# Patient Record
Sex: MALE | Race: BLACK OR AFRICAN AMERICAN | NOT HISPANIC OR LATINO | Employment: UNEMPLOYED | URBAN - METROPOLITAN AREA
[De-identification: names, ages, dates, MRNs, and addresses within clinical notes are randomized per-mention and may not be internally consistent; named-entity substitution may affect disease eponyms.]

---

## 2023-10-03 ENCOUNTER — PHARMACY VISIT (OUTPATIENT)
Dept: PHARMACY | Facility: CLINIC | Age: 33
End: 2023-10-03
Payer: MEDICAID

## 2023-10-03 ENCOUNTER — OFFICE VISIT (OUTPATIENT)
Dept: PRIMARY CARE | Facility: CLINIC | Age: 33
End: 2023-10-03
Payer: COMMERCIAL

## 2023-10-03 VITALS
HEART RATE: 78 BPM | TEMPERATURE: 98.6 F | SYSTOLIC BLOOD PRESSURE: 130 MMHG | HEIGHT: 69 IN | WEIGHT: 132 LBS | BODY MASS INDEX: 19.55 KG/M2 | DIASTOLIC BLOOD PRESSURE: 87 MMHG | OXYGEN SATURATION: 100 %

## 2023-10-03 DIAGNOSIS — F11.90 OPIOID USE DISORDER: Primary | ICD-10-CM

## 2023-10-03 LAB
AMPHETAMINES UR QL SCN: ABNORMAL
BARBITURATES UR QL SCN: ABNORMAL
BZE UR QL SCN: ABNORMAL
CANNABINOIDS UR QL SCN: ABNORMAL
CREAT UR-MCNC: 146.4 MG/DL (ref 20–370)
PCP UR QL SCN: ABNORMAL

## 2023-10-03 PROCEDURE — 80307 DRUG TEST PRSMV CHEM ANLYZR: CPT

## 2023-10-03 PROCEDURE — 99204 OFFICE O/P NEW MOD 45 MIN: CPT | Performed by: STUDENT IN AN ORGANIZED HEALTH CARE EDUCATION/TRAINING PROGRAM

## 2023-10-03 PROCEDURE — 3008F BODY MASS INDEX DOCD: CPT | Performed by: STUDENT IN AN ORGANIZED HEALTH CARE EDUCATION/TRAINING PROGRAM

## 2023-10-03 PROCEDURE — 82570 ASSAY OF URINE CREATININE: CPT

## 2023-10-03 RX ORDER — IBUPROFEN 800 MG/1
800 TABLET ORAL EVERY 8 HOURS PRN
COMMUNITY
Start: 2023-09-23 | End: 2023-10-05 | Stop reason: ALTCHOICE

## 2023-10-03 RX ORDER — BUPRENORPHINE AND NALOXONE 12; 3 MG/1; MG/1
FILM, SOLUBLE BUCCAL; SUBLINGUAL
COMMUNITY
Start: 2023-05-26 | End: 2023-10-03 | Stop reason: ALTCHOICE

## 2023-10-03 RX ORDER — IBUPROFEN 600 MG/1
TABLET ORAL
COMMUNITY
Start: 2023-07-24 | End: 2023-10-05 | Stop reason: ALTCHOICE

## 2023-10-03 RX ORDER — POLYETHYLENE GLYCOL 3350 17 G/17G
17 POWDER, FOR SOLUTION ORAL DAILY PRN
Qty: 30 PACKET | Refills: 1 | Status: SHIPPED | OUTPATIENT
Start: 2023-10-03 | End: 2023-12-02

## 2023-10-03 RX ORDER — NALOXONE HYDROCHLORIDE 4 MG/.1ML
SPRAY NASAL
COMMUNITY
Start: 2023-05-26

## 2023-10-03 RX ORDER — ONDANSETRON 4 MG/1
TABLET, FILM COATED ORAL EVERY 6 HOURS PRN
COMMUNITY
Start: 2019-12-08 | End: 2023-10-05 | Stop reason: ALTCHOICE

## 2023-10-03 RX ORDER — AMOXICILLIN 500 MG/1
CAPSULE ORAL
COMMUNITY
Start: 2023-09-23 | End: 2023-10-05 | Stop reason: ALTCHOICE

## 2023-10-03 RX ORDER — DICYCLOMINE HYDROCHLORIDE 20 MG/1
TABLET ORAL 3 TIMES DAILY PRN
COMMUNITY
Start: 2019-12-08 | End: 2023-10-05 | Stop reason: ALTCHOICE

## 2023-10-03 RX ORDER — LEVETIRACETAM 500 MG/1
TABLET ORAL
COMMUNITY
Start: 2023-05-26 | End: 2023-10-05 | Stop reason: ALTCHOICE

## 2023-10-03 RX ORDER — BUPRENORPHINE AND NALOXONE 12; 3 MG/1; MG/1
1 FILM, SOLUBLE BUCCAL; SUBLINGUAL DAILY
Qty: 14 TABLET | Refills: 1 | Status: SHIPPED | OUTPATIENT
Start: 2023-10-03 | End: 2023-10-10 | Stop reason: SDUPTHER

## 2023-10-03 ASSESSMENT — PATIENT HEALTH QUESTIONNAIRE - PHQ9
SUM OF ALL RESPONSES TO PHQ9 QUESTIONS 1 AND 2: 2
2. FEELING DOWN, DEPRESSED OR HOPELESS: SEVERAL DAYS
1. LITTLE INTEREST OR PLEASURE IN DOING THINGS: SEVERAL DAYS
10. IF YOU CHECKED OFF ANY PROBLEMS, HOW DIFFICULT HAVE THESE PROBLEMS MADE IT FOR YOU TO DO YOUR WORK, TAKE CARE OF THINGS AT HOME, OR GET ALONG WITH OTHER PEOPLE: NOT DIFFICULT AT ALL

## 2023-10-03 ASSESSMENT — COLUMBIA-SUICIDE SEVERITY RATING SCALE - C-SSRS
6. HAVE YOU EVER DONE ANYTHING, STARTED TO DO ANYTHING, OR PREPARED TO DO ANYTHING TO END YOUR LIFE?: NO
2. HAVE YOU ACTUALLY HAD ANY THOUGHTS OF KILLING YOURSELF?: NO
1. IN THE PAST MONTH, HAVE YOU WISHED YOU WERE DEAD OR WISHED YOU COULD GO TO SLEEP AND NOT WAKE UP?: NO

## 2023-10-03 ASSESSMENT — ENCOUNTER SYMPTOMS
OCCASIONAL FEELINGS OF UNSTEADINESS: 0
LOSS OF SENSATION IN FEET: 0
DEPRESSION: 1

## 2023-10-03 ASSESSMENT — PAIN SCALES - GENERAL: PAINLEVEL: 0-NO PAIN

## 2023-10-03 NOTE — PROGRESS NOTES
Subjective   Davin Martínez is a 32 y.o. male who presents for Buprenorphine initiation.     Was first prescribed buprenorphine-naloxone at the end of May after an admission for an overdose. 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 Seizures have not reoccurred since that admission. He was lost to follow up after that admission but reports that he has been stretching his prescription to last as long as possible. He was prescribed buprenorphine-naloxone 12-3 mg BID at discharge and says that he has been taking as little as 1/2 tab daily to make it last. Today is his last dose but he would like to continue using it; although with the ultimate goal of cessation. Since that admission he has gone to a few 12-step meetings but says he felt they weren't for him. Hasn't used since being in the hospital; which is the longest he's gone without doing anything in a long time. He does report withdrawals when it wears off but says that a full tab lasts him long enough that withdrawals aren't an issue. Support system is wife and kids. Doesn't want to go back to using for his kids. They're 8, 5 (in ), and 6 months.    Denies any medical history.  No prior surgeries.  No known allergies.  Meds: no medications; takes ibuprofen PRN    Treatment history: current prescription is leftover from May admission  Recent use: none  Other substances currently used:  none  Nicotine: smoking; a couple cigarettes daily  Occupation: doing side jobs  Living Situation: living with wife and kids    Clinical Opiate Withdrawal Scale  Heart Rate: 78  Patient Position: Sitting    OARRS:  I have personally reviewed the OARRS report for Davin Martínez. I have considered the risks of abuse, dependence, addiction and diversion    Urine Drug Screening  Last Urine Drug Screen: 5/17/23    Recent  "Results (from the past 8760 hour(s))   DRUG SCREEN,URINE    Collection Time: 05/17/23 10:00 AM   Result Value Ref Range    DRUG SCREEN COMMENT URINE SEE BELOW     Amphetamine Screen, Urine PRESUMPTIVE NEGATIVE NEGATIVE    Barbiturate Screen, Urine PRESUMPTIVE NEGATIVE NEGATIVE    BENZODIAZEPINE (PRESENCE) IN URINE BY SCREEN METHOD PRESUMPTIVE POSITIVE (A) NEGATIVE    Cannabinoid Screen, Urine PRESUMPTIVE POSITIVE (A) NEGATIVE    Cocaine Screen, Urine PRESUMPTIVE NEGATIVE NEGATIVE    Fentanyl, Ur PRESUMPTIVE POSITIVE (A) NEGATIVE    Methadone Screen, Urine PRESUMPTIVE NEGATIVE NEGATIVE    Opiate Screen, Urine PRESUMPTIVE POSITIVE (A) NEGATIVE    Oxycodone Screen, Ur PRESUMPTIVE NEGATIVE NEGATIVE    PCP Screen, Urine PRESUMPTIVE NEGATIVE NEGATIVE     Results are as expected.  Patient was actively using at the time.    Withdrawal Symptoms: anxiety and chills after about 12-14 hours.  Buprenorphine Side Effects: constipation occasionally    Agreement for Buprenorphine/(Naloxone) for the treatment of Opioid use  Reviewed Controlled Substance Agreement including but not limited to the benefits, risks, and alternatives to treatment with a Controlled Substance medication(s).   Date of the Last Agreement: will file today    Nicotine Use  Smoking; wants to stop but hasn't quite gotten to the point for quitting yet    Health Maintenance  HIV from 5/17/23 was negative    No results found for: \"LABRPR\", \"RPR\"  Hepatitis C Ab   Date Value Ref Range Status   05/18/2023 NONREACTIVE NONREACTIVE Final     Comment:      Results from patients taking biotin supplements or receiving   high-dose biotin therapy should be interpreted with caution   due to possible interference with this test. Providers may    contact their local laboratory for further information.       Patient is meeting requirements of buprenorphine program, including : abstinence from alcohol and other drugs  and meeting functional/personal goals    Review of Systems " "  Constitutional:  Negative for chills and fever.   Gastrointestinal:  Positive for constipation.   Neurological:  Negative for seizures.     Objective   /87 (BP Location: Right arm, Patient Position: Sitting, BP Cuff Size: Adult)   Pulse 78   Temp 37 °C (98.6 °F) (Temporal)   Ht 1.753 m (5' 9\")   Wt 59.9 kg (132 lb)   SpO2 100%   BMI 19.49 kg/m²     Physical Exam  Vitals reviewed.   Constitutional:       General: He is not in acute distress.     Appearance: Normal appearance.   HENT:      Head: Normocephalic and atraumatic.   Eyes:      Conjunctiva/sclera: Conjunctivae normal.   Cardiovascular:      Rate and Rhythm: Normal rate and regular rhythm.      Heart sounds: No murmur heard.     No friction rub. No gallop.   Pulmonary:      Effort: No respiratory distress.      Breath sounds: Normal breath sounds.   Skin:     General: Skin is warm and dry.   Neurological:      General: No focal deficit present.      Mental Status: He is alert. Mental status is at baseline.   Psychiatric:         Behavior: Behavior normal.       Assessment/Plan   Problem List Items Addressed This Visit             ICD-10-CM    Opioid use disorder - Primary F11.90     Buprenorphine Program:  Meeting requirements to initiate program, for abstinence based OUD recovery goals.  Urine drug screens: ordered screen today  OARRS: Results are as expected.   Recovery Activities: referring to Dr. Vanegas    Risks for abuse of Buprenorphine/Naloxone and safety requirements  (hidden, locked area, out of reach of children and pets; Narcan prescription) are revisited yearly with CSA and as needed- no current concerns.    Benefits of treatment for relapse prevention outweigh risks associated with Buprenorphine.    Plan/Revision to Plan:  Continue current dose of Buprenorphine/Naloxone: 12-3 mg daily  OARRS at every appointment and as needed.  Drug screen random, quarterly and at other times as needed.  Meetings encouraged when safe and " accessible.  Counselling available as needed.         Relevant Medications    polyethylene glycol (Glycolax, Miralax) 17 gram packet    buprenorphine-naloxone (Suboxone) 12-3 mg per sublingual film    Other Relevant Orders    Opiate/Opioid/Benzo Extended Prescription Compliance    Buprenorphine Confirm,Urine     Follow-up in 2 weeks for continued establishment with Buprenorphine Program     Patient seen and discussed with Dr. Cruz.    Suhas Head MD   PGY-3  [facility]

## 2023-10-03 NOTE — PATIENT INSTRUCTIONS
Dear Davin Martínez,     It was a pleasure getting to manage your care with you today.     Prescriptions:  We have sent medication prescriptions to your pharmacy on file. Please pick them up at your earliest convenience.     Referral:   We have provided you the below referrals. Please call to schedule referrals if no one has contacted you within 3 days.   1. Dr. Vanegas    Follow up Appointment: 1.5 weeks for dose reassessment    Emergency  In the case of an emergency please call 911 or visit the Emergency Department immediately for evaluation.     We look forward to continuing your care here at our Clinic. Take Care.     Sincerely,   Suhas Head MD

## 2023-10-05 PROBLEM — R56.9 SEIZURE-LIKE ACTIVITY (MULTI): Status: ACTIVE | Noted: 2022-10-18

## 2023-10-05 PROBLEM — F11.921: Status: RESOLVED | Noted: 2022-10-18 | Resolved: 2023-10-05

## 2023-10-05 PROBLEM — F11.921: Status: ACTIVE | Noted: 2022-10-18

## 2023-10-05 PROBLEM — S06.89AA: Status: ACTIVE | Noted: 2023-10-05

## 2023-10-05 PROBLEM — R56.9 SEIZURE-LIKE ACTIVITY (MULTI): Status: RESOLVED | Noted: 2022-10-18 | Resolved: 2023-10-05

## 2023-10-05 ASSESSMENT — ENCOUNTER SYMPTOMS
FEVER: 0
CONSTIPATION: 1
SEIZURES: 0
CHILLS: 0

## 2023-10-06 NOTE — ASSESSMENT & PLAN NOTE
Buprenorphine Program:  Meeting requirements to initiate program, for abstinence based OUD recovery goals.  Urine drug screens: ordered screen today  OARRS: Results are as expected.   Recovery Activities: referring to Dr. Vanegas    Risks for abuse of Buprenorphine/Naloxone and safety requirements  (hidden, locked area, out of reach of children and pets; Narcan prescription) are revisited yearly with CSA and as needed- no current concerns.    Benefits of treatment for relapse prevention outweigh risks associated with Buprenorphine.    Plan/Revision to Plan:  Continue current dose of Buprenorphine/Naloxone: 12-3 mg daily  OARRS at every appointment and as needed.  Drug screen random, quarterly and at other times as needed.  Meetings encouraged when safe and accessible.  Counselling available as needed.

## 2023-10-07 LAB
BUPRENORPHINE UR-MCNC: 51 NG/ML
BUPRENORPHINE UR-MCNC: <2 NG/ML
NALOXONE UR CFM-MCNC: <100 NG/ML
NORBUPRENORPHINE UR CFM-MCNC: 127 NG/ML
NORBUPRENORPHINE UR-MCNC: 27 NG/ML

## 2023-10-10 DIAGNOSIS — F11.90 OPIOID USE DISORDER: ICD-10-CM

## 2023-10-10 RX ORDER — BUPRENORPHINE AND NALOXONE 12; 3 MG/1; MG/1
1 FILM, SOLUBLE BUCCAL; SUBLINGUAL DAILY
Qty: 16 TABLET | Refills: 0 | Status: SHIPPED | OUTPATIENT
Start: 2023-10-10 | End: 2023-11-25 | Stop reason: SDUPTHER

## 2023-10-10 NOTE — PROGRESS NOTES
Unable to get follow up until 10/31. Will provide additional 2 week supply to last until that visit.

## 2023-10-13 NOTE — PROGRESS NOTES
I saw and evaluated the patient. I personally obtained the key and critical portions of the history and physical exam or was physically present for key and critical portions performed by the resident/fellow. I reviewed the resident/fellow's documentation and discussed the patient with the resident/fellow. I agree with the resident/fellow's medical decision making as documented in the note.    Lise Cruz MD

## 2023-10-26 ENCOUNTER — PHARMACY VISIT (OUTPATIENT)
Dept: PHARMACY | Facility: CLINIC | Age: 33
End: 2023-10-26
Payer: MEDICAID

## 2023-10-26 ENCOUNTER — TELEPHONE (OUTPATIENT)
Dept: PRIMARY CARE | Facility: CLINIC | Age: 33
End: 2023-10-26

## 2023-10-26 PROCEDURE — RXMED WILLOW AMBULATORY MEDICATION CHARGE

## 2023-10-26 NOTE — TELEPHONE ENCOUNTER
PT is coming in asking for a refill of their Suboxone.  I am going to try to get him scheduled with you at your earliers appointment.

## 2023-11-09 NOTE — TELEPHONE ENCOUNTER
Copied from CRM #[id number]. Topic: Transfer to Department for Scheduling  >> Oct 26, 2023  9:57 AM Mary KERR wrote:  Patients needs medication refill

## 2023-11-25 ENCOUNTER — PHARMACY VISIT (OUTPATIENT)
Dept: PHARMACY | Facility: CLINIC | Age: 33
End: 2023-11-25

## 2023-11-25 ENCOUNTER — HOSPITAL ENCOUNTER (EMERGENCY)
Facility: HOSPITAL | Age: 33
Discharge: HOME | End: 2023-11-25
Attending: STUDENT IN AN ORGANIZED HEALTH CARE EDUCATION/TRAINING PROGRAM

## 2023-11-25 VITALS
SYSTOLIC BLOOD PRESSURE: 120 MMHG | RESPIRATION RATE: 18 BRPM | OXYGEN SATURATION: 100 % | DIASTOLIC BLOOD PRESSURE: 61 MMHG | HEART RATE: 87 BPM | TEMPERATURE: 97.8 F | BODY MASS INDEX: 19.55 KG/M2 | WEIGHT: 132 LBS | HEIGHT: 69 IN

## 2023-11-25 DIAGNOSIS — F11.90 OPIOID USE DISORDER: ICD-10-CM

## 2023-11-25 DIAGNOSIS — Z76.0 MEDICATION REFILL: Primary | ICD-10-CM

## 2023-11-25 PROCEDURE — 99283 EMERGENCY DEPT VISIT LOW MDM: CPT | Performed by: STUDENT IN AN ORGANIZED HEALTH CARE EDUCATION/TRAINING PROGRAM

## 2023-11-25 PROCEDURE — 2500000002 HC RX 250 W HCPCS SELF ADMINISTERED DRUGS (ALT 637 FOR MEDICARE OP, ALT 636 FOR OP/ED): Mod: SE | Performed by: STUDENT IN AN ORGANIZED HEALTH CARE EDUCATION/TRAINING PROGRAM

## 2023-11-25 RX ORDER — BUPRENORPHINE AND NALOXONE 4; 1 MG/1; MG/1
3 FILM, SOLUBLE BUCCAL; SUBLINGUAL ONCE
Status: COMPLETED | OUTPATIENT
Start: 2023-11-25 | End: 2023-11-25

## 2023-11-25 RX ORDER — BUPRENORPHINE AND NALOXONE 12; 3 MG/1; MG/1
1 FILM, SOLUBLE BUCCAL; SUBLINGUAL DAILY
Qty: 16 EACH | Refills: 0 | Status: SHIPPED | OUTPATIENT
Start: 2023-11-25 | End: 2024-01-15 | Stop reason: SDUPTHER

## 2023-11-25 RX ADMIN — BUPRENORPHINE AND NALOXONE 3 FILM: 4; 1 FILM BUCCAL; SUBLINGUAL at 11:28

## 2023-11-25 ASSESSMENT — LIFESTYLE VARIABLES
HAVE YOU EVER FELT YOU SHOULD CUT DOWN ON YOUR DRINKING: NO
EVER FELT BAD OR GUILTY ABOUT YOUR DRINKING: NO
EVER HAD A DRINK FIRST THING IN THE MORNING TO STEADY YOUR NERVES TO GET RID OF A HANGOVER: NO
REASON UNABLE TO ASSESS: NO
HAVE PEOPLE ANNOYED YOU BY CRITICIZING YOUR DRINKING: NO

## 2023-11-25 ASSESSMENT — PAIN SCALES - GENERAL: PAINLEVEL_OUTOF10: 0 - NO PAIN

## 2023-11-25 ASSESSMENT — PAIN - FUNCTIONAL ASSESSMENT: PAIN_FUNCTIONAL_ASSESSMENT: 0-10

## 2023-11-25 NOTE — ED TRIAGE NOTES
Pt states states he's withdrawn from percocet that he took 2days ago. Pt states he took 6 pills in one day because he was in pain from his back. States he did not get the script from here

## 2023-11-25 NOTE — ED PROVIDER NOTES
HPI  Patient is a 32-year-old male with PMH of opiate use disorder presented to the emergency department due to medication refill.  Patient states that he takes Suboxone 12-3 mg daily, however unfortunately ran out over the past 2 days.  Patient states that he last took Suboxone approximately 48 hours ago and feels like he has been withdrawing for the past 24 hours.  Does feel agitated and started to have abdominal cramps, however denies any zak vomiting or diarrhea at this time.  Does follow-up with family medicine as an outpatient.    Physical Exam  VITALS: Vital signs reviewed in nursing triage note, EMR flow sheets, and at patient's bedside  CONSTITUTIONAL: Well-appearing, in no apparent distress  HEAD: NCAT  EYES: PERRL, EOMI  NECK: Full ROM  CARD:  No visible JVD or lower extremity edema  RESP: Speaking in full sentences, no increased work of breathing  ABD: Nondistended, nontender  EXT: Full ROM in all extremities  SKIN: No rashes or lesions  NEURO: MAEx4, AAOX4  PSYCH: Appropriate mood and affect, no HI/SI, not responding to internal stimuli    Vitals:    11/25/23 1041   BP: 120/61   Pulse: 87   Resp: 18   Temp: 36.6 °C (97.8 °F)   SpO2: 100%        Assessment/Plan/MDM  Patient clinically stable with normal vital signs upon presentation to the emergency department.  Given his symptoms of withdrawal, was provided with a dose of his home Suboxone here in the emergency department.  Prescription sent to [facility] pharmacy per his preference.  Patient discharged in stable condition.    Results  *See section(s) entitled ``Lab Results´´, ``Diagnostic Imaging Results Review´´ for entirety.  Notable results listed below  - EKG, labs, and imaging deemed not necessary at this visit    Clinical Impression  Medication refill    Dispo:   Discharge home    Home: I discussed the differential, results and discharge plan with the patient and/or family/friend/caregiver if present. I emphasized the importance of follow-up with  the physician I referred them to in the timeframe recommended. I explained reasons for the patient to return to the Emergency Department. Questions were addressed. They understand return precautions and discharge instructions. The patient and/or family/friend/caregiver expressed understanding and agreement with assessment/plan.     Patient seen and discussed with attending physician Dr. Perez.    Rodger Frey MD  Emergency Medicine, PGY-3    Was dictated using Dragon dictation. Please excuse any errors found in the note.     Rodger Frey MD  Resident  11/25/23 7562

## 2023-11-27 ENCOUNTER — HOSPITAL ENCOUNTER (EMERGENCY)
Facility: HOSPITAL | Age: 33
Discharge: HOME | End: 2023-11-27
Attending: EMERGENCY MEDICINE

## 2023-11-27 ENCOUNTER — PHARMACY VISIT (OUTPATIENT)
Dept: PHARMACY | Facility: CLINIC | Age: 33
End: 2023-11-27

## 2023-11-27 VITALS
DIASTOLIC BLOOD PRESSURE: 66 MMHG | SYSTOLIC BLOOD PRESSURE: 133 MMHG | HEIGHT: 69 IN | BODY MASS INDEX: 22.22 KG/M2 | HEART RATE: 77 BPM | WEIGHT: 150 LBS | OXYGEN SATURATION: 100 % | TEMPERATURE: 98 F | RESPIRATION RATE: 16 BRPM

## 2023-11-27 DIAGNOSIS — F11.93 OPIOID WITHDRAWAL (MULTI): Primary | ICD-10-CM

## 2023-11-27 PROCEDURE — RXMED WILLOW AMBULATORY MEDICATION CHARGE

## 2023-11-27 PROCEDURE — 2500000005 HC RX 250 GENERAL PHARMACY W/O HCPCS: Mod: SE | Performed by: EMERGENCY MEDICINE

## 2023-11-27 PROCEDURE — 99284 EMERGENCY DEPT VISIT MOD MDM: CPT | Performed by: EMERGENCY MEDICINE

## 2023-11-27 PROCEDURE — 99283 EMERGENCY DEPT VISIT LOW MDM: CPT | Performed by: EMERGENCY MEDICINE

## 2023-11-27 RX ORDER — LOPERAMIDE HYDROCHLORIDE 2 MG/1
2 CAPSULE ORAL 4 TIMES DAILY PRN
Qty: 12 CAPSULE | Refills: 0 | Status: SHIPPED | OUTPATIENT
Start: 2023-11-27 | End: 2023-11-30

## 2023-11-27 RX ORDER — CLONIDINE HYDROCHLORIDE 0.1 MG/1
0.1 TABLET ORAL ONCE
Status: DISCONTINUED | OUTPATIENT
Start: 2023-11-27 | End: 2023-11-27 | Stop reason: HOSPADM

## 2023-11-27 RX ORDER — ONDANSETRON 4 MG/1
4 TABLET, ORALLY DISINTEGRATING ORAL ONCE
Status: COMPLETED | OUTPATIENT
Start: 2023-11-27 | End: 2023-11-27

## 2023-11-27 RX ORDER — ONDANSETRON 4 MG/1
TABLET, FILM COATED ORAL
Status: DISCONTINUED
Start: 2023-11-27 | End: 2023-11-27 | Stop reason: HOSPADM

## 2023-11-27 RX ORDER — ONDANSETRON 4 MG/1
4 TABLET, FILM COATED ORAL EVERY 6 HOURS
Qty: 12 TABLET | Refills: 0 | Status: SHIPPED | OUTPATIENT
Start: 2023-11-27 | End: 2023-11-30

## 2023-11-27 RX ORDER — CLONIDINE HYDROCHLORIDE 0.2 MG/1
0.1 TABLET ORAL 2 TIMES DAILY
Qty: 20 TABLET | Refills: 0 | Status: SHIPPED | OUTPATIENT
Start: 2023-11-27 | End: 2023-12-18

## 2023-11-27 RX ADMIN — ONDANSETRON 4 MG: 4 TABLET, ORALLY DISINTEGRATING ORAL at 10:00

## 2023-11-27 NOTE — ED PROVIDER NOTES
HPI   Chief Complaint   Patient presents with    Medication Reaction       HPI  Young man with a history of opioid use disorder, last used in May currently on Suboxone presenting to the emergency department with nausea, myalgias, yawning, abdominal crampy discomfort, loose stools in the setting of being without his Suboxone.  Patient was seen and evaluated for complaint of same a couple of days ago, at that time was written a prescription, the prescription is available at the pharmacy needed insurance approval but he is still having symptoms persistent.  He denies any dysuria or hematuria no falls injuries or trauma no headache no vision disturbance no other complaints, states this feels like his typical withdrawal.                  [place] Coma Scale Score: 15                  Patient History   Past Medical History:   Diagnosis Date    Crushing injury of finger 04/12/2011    Open fracture of phalanx or phalanges of hand 04/12/2011    Opioid intoxication delirium (CMS/HCC) 10/18/2022    Seizure-like activity (CMS/[facility]) 10/18/2022     Past Surgical History:   Procedure Laterality Date    CT ANGIO NECK  5/24/2023    CT NECK ANGIO W AND WO IV CONTRAST 5/24/2023 [facility]    CT HEAD ANGIO W AND WO IV CONTRAST  5/24/2023    CT HEAD ANGIO W AND WO IV CONTRAST 5/24/2023 [facility] CT     No family history on file.  Social History     Tobacco Use    Smoking status: Every Day     Types: Cigarettes    Smokeless tobacco: Never   Substance Use Topics    Alcohol use: Never    Drug use: Not Currently     Types: Marijuana       Physical Exam   ED Triage Vitals [11/27/23 0919]   Temp Heart Rate Resp BP   36.7 °C (98 °F) 77 16 133/66      SpO2 Temp Source Heart Rate Source Patient Position   100 % Temporal -- --      BP Location FiO2 (%)     -- --       Physical Exam  Constitutional:       Appearance: Normal appearance. He is normal weight.   HENT:      Head: Normocephalic and atraumatic.      Right Ear: External ear normal.      Left Ear:  External ear normal.      Nose: Congestion present.      Mouth/Throat:      Mouth: Mucous membranes are moist.      Pharynx: Oropharynx is clear.   Eyes:      Pupils: Pupils are equal, round, and reactive to light.   Cardiovascular:      Rate and Rhythm: Normal rate and regular rhythm.   Pulmonary:      Effort: Pulmonary effort is normal.      Breath sounds: Normal breath sounds.   Abdominal:      General: Abdomen is flat.      Palpations: Abdomen is soft.   Musculoskeletal:         General: Normal range of motion.      Cervical back: Normal range of motion and neck supple.   Skin:     General: Skin is warm and dry.      Capillary Refill: Capillary refill takes less than 2 seconds.   Neurological:      General: No focal deficit present.      Mental Status: He is alert and oriented to person, place, and time.   Psychiatric:         Mood and Affect: Mood normal.         Behavior: Behavior normal.         Thought Content: Thought content normal.         Judgment: Judgment normal.         ED Course & MDM        Medical Decision Making  -Chart was reviewed, the patient was provided with a prescription, he states that he has access to the prescription, is here for symptomatic management.  -The patient drove his car here with his young son, unable to secure a ride, because of this I had a discussion with the patient at the bedside of the risk of prescribing an acute dose of his Suboxone, instead we will give him medications for symptom management including clonidine, Zofran  -Given that the patient is to  his prescription for Suboxone I will provide him with paper prescriptions for clonidine and Zofran as well as loperamide to be filled [facility] so that he has immediate access to symptom management in the interim.  -Patient is to resume his Suboxone as scheduled and prescribed, follow-up outpatient with his primary physician as scheduled monitor for symptoms and return with concerns.       Santi HO  DO Pranay  11/27/23 1002

## 2023-11-27 NOTE — ED TRIAGE NOTES
Pt coming with withdrawal symptoms states he been taking this medication X 2 months that he was prescribed, unable to tell what what the medication is accept it is to treat his bipolar he is feeling sweaty and anxious last time he took the medication was 4 days ago, pt also complains of chills and body aches. Pt denies SI/HI

## 2023-11-29 ENCOUNTER — HOSPITAL ENCOUNTER (EMERGENCY)
Facility: HOSPITAL | Age: 33
Discharge: HOME | End: 2023-11-29
Attending: EMERGENCY MEDICINE

## 2023-11-29 ENCOUNTER — PHARMACY VISIT (OUTPATIENT)
Dept: PHARMACY | Facility: CLINIC | Age: 33
End: 2023-11-29

## 2023-11-29 VITALS
TEMPERATURE: 98.4 F | HEIGHT: 69 IN | RESPIRATION RATE: 16 BRPM | OXYGEN SATURATION: 99 % | BODY MASS INDEX: 22.22 KG/M2 | SYSTOLIC BLOOD PRESSURE: 120 MMHG | DIASTOLIC BLOOD PRESSURE: 61 MMHG | WEIGHT: 150 LBS | HEART RATE: 84 BPM

## 2023-11-29 DIAGNOSIS — Z76.0 MEDICATION REFILL: Primary | ICD-10-CM

## 2023-11-29 PROCEDURE — 99283 EMERGENCY DEPT VISIT LOW MDM: CPT | Performed by: NURSE PRACTITIONER

## 2023-11-29 PROCEDURE — RXMED WILLOW AMBULATORY MEDICATION CHARGE

## 2023-11-29 PROCEDURE — 99281 EMR DPT VST MAYX REQ PHY/QHP: CPT | Mod: 27 | Performed by: EMERGENCY MEDICINE

## 2023-11-29 ASSESSMENT — PAIN - FUNCTIONAL ASSESSMENT: PAIN_FUNCTIONAL_ASSESSMENT: 0-10

## 2023-11-29 ASSESSMENT — LIFESTYLE VARIABLES
EVER FELT BAD OR GUILTY ABOUT YOUR DRINKING: NO
REASON UNABLE TO ASSESS: NO
REASON UNABLE TO ASSESS: NO
HAVE PEOPLE ANNOYED YOU BY CRITICIZING YOUR DRINKING: NO
HAVE YOU EVER FELT YOU SHOULD CUT DOWN ON YOUR DRINKING: NO
HAVE PEOPLE ANNOYED YOU BY CRITICIZING YOUR DRINKING: NO
EVER FELT BAD OR GUILTY ABOUT YOUR DRINKING: NO
EVER HAD A DRINK FIRST THING IN THE MORNING TO STEADY YOUR NERVES TO GET RID OF A HANGOVER: NO
EVER HAD A DRINK FIRST THING IN THE MORNING TO STEADY YOUR NERVES TO GET RID OF A HANGOVER: NO

## 2023-11-29 ASSESSMENT — PAIN DESCRIPTION - LOCATION: LOCATION: GENERALIZED

## 2023-11-29 ASSESSMENT — PAIN SCALES - GENERAL
PAINLEVEL_OUTOF10: 0 - NO PAIN
PAINLEVEL_OUTOF10: 6

## 2023-11-29 NOTE — ED PROVIDER NOTES
Emergency Department Encounter  [facility] EMERGENCY MEDICINE    Patient: Davin Martínez  MRN: [id number]  : 1990  Date of Evaluation: 2023  ED Provider: NATALIIA Magallon      Chief Complaint       Chief Complaint   Patient presents with    Generalized Body Aches     [place]       Limitations to History: None Language Barrier Confusion Intoxication Uncooperative Dementia  Historian: Patient, Family, EMS, Significant other,Caregiver, Friend, CPD  Records reviewed: EMR inpatient and outpatient notes, Care Everywhere    Davin Martínez is a 32 y.o. male who presents to the emergency department complaining of withdrawal symptoms from Suboxone.  Patient states he last filled his Suboxone in October.  He did not remember to renew his insurance and when he went to fill his Suboxone this month they would not pay for the medication.  This is his third visit to the emergency department for withdrawal symptoms.  On his first visit he was given a dose of Suboxone.  On his second visit 2 days ago he was given prescriptions for clonidine and Zofran.  He did fill the clonidine and took the medication but states that now made him feel worse.  He did not fill the Zofran prescription.  He is here today as his insurance still has not kicked in yet.  He states that it will start again on  which is in 2 days.  He is hoping to get a another dose of the Suboxone here in the emergency department today.  He states he is has some symptoms of being sweaty and having chills.  He has had no exposure to anyone who is sick.  He denies any abdominal pain fever chest pain or shortness of breath.  He does get his prescription filled by his family physician.  The medication is at the pharmacy and is waiting to be picked up.    ROS:     Review of Systems  All other systems have been reviewed and are otherwise acutely negative except as in the [place].    Past History     Past Medical History:   Diagnosis  Date    Crushing injury of finger 04/12/2011    Open fracture of phalanx or phalanges of hand 04/12/2011    Opioid intoxication delirium (CMS/[facility]) 10/18/2022    Seizure-like activity (CMS/[facility]) 10/18/2022     Past Surgical History:   Procedure Laterality Date    CT ANGIO NECK  5/24/2023    CT NECK ANGIO W AND WO IV CONTRAST 5/24/2023 [facility]    CT HEAD ANGIO W AND WO IV CONTRAST  5/24/2023    CT HEAD ANGIO W AND WO IV CONTRAST 5/24/2023 [facility]           Medications/Allergies     Previous Medications    BUPRENORPHINE-NALOXONE (SUBOXONE) 12-3 MG PER SUBLINGUAL FILM    Place 1 Film under the tongue once daily.    CLONIDINE (CATAPRES) 0.2 MG TABLET    Take 1/2 tablets (0.1 mg) by mouth 2 times a day for 15 days.    LOPERAMIDE (IMODIUM) 2 MG CAPSULE    Take 1 capsule (2 mg) by mouth 4 times a day as needed for diarrhea for up to 3 days.    NALOXONE (NARCAN) 4 MG/0.1 ML NASAL SPRAY        ONDANSETRON (ZOFRAN) 4 MG TABLET    Take 1 tablet (4 mg) by mouth every 6 hours for 3 days.    POLYETHYLENE GLYCOL (GLYCOLAX, MIRALAX) 17 GRAM PACKET    Take 17 g by mouth once daily as needed (constipation).     No Known Allergies     Physical Exam       ED Triage Vitals   Temp Heart Rate Resp BP   11/29/23 0940 11/29/23 0940 11/29/23 0940 11/29/23 0940   (!) 16 °C (60.8 °F) 84 16 120/61      SpO2 Temp Source Heart Rate Source Patient Position   11/29/23 0940 11/29/23 1029 -- --   99 % Oral        BP Location FiO2 (%)     -- --               Physical Exam    GENERAL:  The patient appears nourished and normally developed. Vital signs as documented.     HEENT:  Head normocephalic, atraumatic, EOMs intact, PERRLA, Mucous membranes moist. Nares patent without copious rhinorrhea.  No lymphadenopathy.    PULMONARY:  Lungs are clear to auscultation, without any respiratory distress. Able to speak full sentences, no accessory muscle use    CARDIAC:   Normal rate. No murmurs, rubs or gallops    MUSCULOSKELETAL:   No tenderness of cervical,  "thoracic and lumbar spine, no step off or deformity noted,  Able to ambulate, Non edematous, with no obvious deformities    SKIN:   Good color, with no significant rashes on exposed skin      NEURO:  No obvious neurological deficits, normal sensation and strength bilaterally.  Able to follow commands, CN 2-12 grossly intact.      Assessment   In brief, Davin Martínez is a 32 y.o. male who presented to the emergency department for withdrawal symptoms from Suboxone.  Patient will go to the pharmacy he states that he can pay cash for 2 days of the medication.  When his insurance kicks in on December 1 then he will be able to  the entire prescription on his insurance.  I have discussed this plan of care with my attending physician as he has been to the emergency department 3 times this week.  My attending physician is in agreement with the plan of care.  Patient is also in agreement with the plan of care.      ED Course     Diagnoses as of 11/29/23 1047   Medication refill       Visit Vitals  /61   Pulse 84   Temp 36.9 °C (98.4 °F) (Oral)   Resp 16   Ht 1.753 m (5' 9\")   Wt 68 kg (150 lb)   SpO2 99%   BMI 22.15 kg/m²   Smoking Status Every Day   BSA 1.82 m²       Medications - No data to display    Plan of care discussed,       Final Impression      1. Medication refill          DISPOSITION  Disposition: Discharge  Patient condition is: Stable    Comment: Please note this report has been produced using speech recognition software and may contain errors related to that system including errors in grammar, punctuation, and spelling, as well as words and phrases that may be inappropriate.  If there are any questions or concerns please feel free to contact the dictating provider for clarification.    DELON Magallon-NATALIIA Block  11/29/23 1054    "

## 2023-12-05 ENCOUNTER — PHARMACY VISIT (OUTPATIENT)
Dept: PHARMACY | Facility: CLINIC | Age: 33
End: 2023-12-05

## 2023-12-05 PROCEDURE — RXMED WILLOW AMBULATORY MEDICATION CHARGE

## 2023-12-11 ENCOUNTER — PHARMACY VISIT (OUTPATIENT)
Dept: PHARMACY | Facility: CLINIC | Age: 33
End: 2023-12-11

## 2023-12-11 PROCEDURE — RXMED WILLOW AMBULATORY MEDICATION CHARGE

## 2023-12-16 ENCOUNTER — PHARMACY VISIT (OUTPATIENT)
Dept: PHARMACY | Facility: CLINIC | Age: 33
End: 2023-12-16

## 2023-12-16 PROCEDURE — RXMED WILLOW AMBULATORY MEDICATION CHARGE

## 2023-12-22 ENCOUNTER — PHARMACY VISIT (OUTPATIENT)
Dept: PHARMACY | Facility: CLINIC | Age: 33
End: 2023-12-22

## 2023-12-22 PROCEDURE — RXMED WILLOW AMBULATORY MEDICATION CHARGE

## 2023-12-28 ENCOUNTER — PHARMACY VISIT (OUTPATIENT)
Dept: PHARMACY | Facility: CLINIC | Age: 33
End: 2023-12-28

## 2023-12-28 PROCEDURE — RXMED WILLOW AMBULATORY MEDICATION CHARGE

## 2024-01-03 ENCOUNTER — PHARMACY VISIT (OUTPATIENT)
Dept: PHARMACY | Facility: CLINIC | Age: 34
End: 2024-01-03
Payer: MEDICARE

## 2024-01-03 PROCEDURE — RXMED WILLOW AMBULATORY MEDICATION CHARGE

## 2024-01-08 ENCOUNTER — PHARMACY VISIT (OUTPATIENT)
Dept: PHARMACY | Facility: CLINIC | Age: 34
End: 2024-01-08
Payer: MEDICARE

## 2024-01-08 PROCEDURE — RXMED WILLOW AMBULATORY MEDICATION CHARGE

## 2024-01-15 ENCOUNTER — HOSPITAL ENCOUNTER (EMERGENCY)
Facility: HOSPITAL | Age: 34
Discharge: HOME | End: 2024-01-15
Payer: COMMERCIAL

## 2024-01-15 VITALS
TEMPERATURE: 97.5 F | RESPIRATION RATE: 16 BRPM | OXYGEN SATURATION: 100 % | DIASTOLIC BLOOD PRESSURE: 77 MMHG | HEART RATE: 76 BPM | SYSTOLIC BLOOD PRESSURE: 138 MMHG

## 2024-01-15 DIAGNOSIS — Z76.0 MEDICATION REFILL: Primary | ICD-10-CM

## 2024-01-15 DIAGNOSIS — F11.90 OPIOID USE DISORDER: ICD-10-CM

## 2024-01-15 DIAGNOSIS — Z79.899 ENCOUNTER FOR MONITORING SUBOXONE MAINTENANCE THERAPY: ICD-10-CM

## 2024-01-15 DIAGNOSIS — Z51.81 ENCOUNTER FOR MONITORING SUBOXONE MAINTENANCE THERAPY: ICD-10-CM

## 2024-01-15 PROCEDURE — 99283 EMERGENCY DEPT VISIT LOW MDM: CPT | Performed by: PHYSICIAN ASSISTANT

## 2024-01-15 PROCEDURE — 99281 EMR DPT VST MAYX REQ PHY/QHP: CPT | Performed by: PHYSICIAN ASSISTANT

## 2024-01-15 PROCEDURE — 99283 EMERGENCY DEPT VISIT LOW MDM: CPT

## 2024-01-15 RX ORDER — BUPRENORPHINE AND NALOXONE 12; 3 MG/1; MG/1
1 FILM, SOLUBLE BUCCAL; SUBLINGUAL DAILY
Qty: 30 FILM | Refills: 0 | Status: SHIPPED | OUTPATIENT
Start: 2024-01-15 | End: 2024-01-15 | Stop reason: SDUPTHER

## 2024-01-15 RX ORDER — BUPRENORPHINE AND NALOXONE 12; 3 MG/1; MG/1
1 FILM, SOLUBLE BUCCAL; SUBLINGUAL DAILY
Qty: 30 FILM | Refills: 0 | Status: SHIPPED | OUTPATIENT
Start: 2024-01-15 | End: 2024-01-16 | Stop reason: SDUPTHER

## 2024-01-15 ASSESSMENT — COLUMBIA-SUICIDE SEVERITY RATING SCALE - C-SSRS
2. HAVE YOU ACTUALLY HAD ANY THOUGHTS OF KILLING YOURSELF?: NO
6. HAVE YOU EVER DONE ANYTHING, STARTED TO DO ANYTHING, OR PREPARED TO DO ANYTHING TO END YOUR LIFE?: NO
1. IN THE PAST MONTH, HAVE YOU WISHED YOU WERE DEAD OR WISHED YOU COULD GO TO SLEEP AND NOT WAKE UP?: NO

## 2024-01-15 NOTE — ED PROVIDER NOTES
HPI:  33-year-old male with history of opioid use disorder on Suboxone presents for Suboxone refill.  States been out for a few days now.  His next scheduled follow-up appointment is not for another month.  States he has myalgias reminiscent of his opioid withdrawal symptoms.  He has not concern for flu or COVID or any other etiology.  Denies any cough, fever, chills, night sweats or rigors.      Physical Exam:   GEN: Vitals noted. NAD  EYES:  EOMs grossly intact, anicteric sclera  MARIA INES: Mucosa moist.  NECK: Supple.  CARD: RRR  PULMONARY: Moving air well. Clear all lung fields.  ABDOMEN: Soft, no guarding, no rigidity. Nontender. NABS  EXTREMITIES: Full ROM, no pitting edema,   SKIN: Intact, warm and dry  NEURO: Alert and oriented x 3, speech is clear, no obvious deficits noted.       ----------------------------------------------------------------------------------------------------------------------------    MDM:  33-year-old male presenting for Suboxone refill.  On exam he is well-appearing in bed comfortably.  Vital signs stable.  Lungs CTABL.  His OARRS was reviewed and will refill his Suboxone per his known dose of 12/3, will give 1 month supply to carry him over until he can see his PCP.  Return precautions reviewed.    No orders to display     Labs Reviewed - No data to display  Diagnoses as of 01/15/24 1427   Medication refill   Encounter for monitoring Suboxone maintenance therapy     ----------------------------------------------------------------------------------------------------------------------------    This note was dictated using a speech recognition program.  While an attempt was made at proof reading to minimize errors, minor errors in transcription may be present call for questions.     Kalyan Muniz PA-C  01/15/24 4791

## 2024-01-16 ENCOUNTER — HOSPITAL ENCOUNTER (EMERGENCY)
Facility: HOSPITAL | Age: 34
Discharge: HOME | End: 2024-01-16
Payer: COMMERCIAL

## 2024-01-16 ENCOUNTER — PHARMACY VISIT (OUTPATIENT)
Dept: PHARMACY | Facility: CLINIC | Age: 34
End: 2024-01-16
Payer: MEDICARE

## 2024-01-16 DIAGNOSIS — F11.93 OPIOID WITHDRAWAL (MULTI): ICD-10-CM

## 2024-01-16 DIAGNOSIS — F11.90 OPIOID USE DISORDER: ICD-10-CM

## 2024-01-16 PROCEDURE — 4500999001 HC ED NO CHARGE

## 2024-01-16 PROCEDURE — 99283 EMERGENCY DEPT VISIT LOW MDM: CPT

## 2024-01-16 PROCEDURE — RXMED WILLOW AMBULATORY MEDICATION CHARGE

## 2024-01-16 RX ORDER — BUPRENORPHINE AND NALOXONE 12; 3 MG/1; MG/1
1 FILM, SOLUBLE BUCCAL; SUBLINGUAL DAILY
Qty: 30 EACH | Refills: 0 | Status: SHIPPED | OUTPATIENT
Start: 2024-01-16 | End: 2024-03-08 | Stop reason: SDUPTHER

## 2024-02-16 ENCOUNTER — APPOINTMENT (OUTPATIENT)
Dept: PRIMARY CARE | Facility: CLINIC | Age: 34
End: 2024-02-16
Payer: COMMERCIAL

## 2024-02-16 ENCOUNTER — TELEPHONE (OUTPATIENT)
Dept: PRIMARY CARE | Facility: CLINIC | Age: 34
End: 2024-02-16

## 2024-02-16 NOTE — TELEPHONE ENCOUNTER
Patient's appointment was cancelled due to his insurance being out of network. Patient is wondering if he can get his refill of Suboxone sent to [facility]

## 2024-02-19 NOTE — TELEPHONE ENCOUNTER
Discussed with Dr. Cruz. We unfortunately cannot provide a Suboxone refill without seeing the patient.    Spoke with patient. He expressed understanding. I discussed option of returning to ER as he has been doing. I also discussed that there are several clinic options available that are in network. He says that he already has a list. I mentioned that [facility] has a walk in clinic that he could go to tomorrow. He expressed understanding.    Suhas Head MD  FM PGY-3

## 2024-03-08 ENCOUNTER — HOSPITAL ENCOUNTER (EMERGENCY)
Facility: HOSPITAL | Age: 34
Discharge: HOME | End: 2024-03-08
Attending: EMERGENCY MEDICINE
Payer: COMMERCIAL

## 2024-03-08 VITALS
BODY MASS INDEX: 20.73 KG/M2 | HEIGHT: 69 IN | RESPIRATION RATE: 14 BRPM | DIASTOLIC BLOOD PRESSURE: 66 MMHG | OXYGEN SATURATION: 100 % | SYSTOLIC BLOOD PRESSURE: 124 MMHG | HEART RATE: 78 BPM | TEMPERATURE: 96.1 F | WEIGHT: 140 LBS

## 2024-03-08 DIAGNOSIS — F11.90 OPIOID USE DISORDER: Primary | ICD-10-CM

## 2024-03-08 PROCEDURE — 99284 EMERGENCY DEPT VISIT MOD MDM: CPT | Performed by: EMERGENCY MEDICINE

## 2024-03-08 PROCEDURE — RXMED WILLOW AMBULATORY MEDICATION CHARGE

## 2024-03-08 PROCEDURE — 99283 EMERGENCY DEPT VISIT LOW MDM: CPT

## 2024-03-08 RX ORDER — BUPRENORPHINE 2 MG/1
12 TABLET SUBLINGUAL ONCE
Status: DISCONTINUED | OUTPATIENT
Start: 2024-03-08 | End: 2024-03-08

## 2024-03-08 RX ORDER — BUPRENORPHINE AND NALOXONE 12; 3 MG/1; MG/1
1 FILM, SOLUBLE BUCCAL; SUBLINGUAL DAILY
Qty: 30 EACH | Refills: 1 | Status: SHIPPED | OUTPATIENT
Start: 2024-03-08 | End: 2024-04-29 | Stop reason: SDUPTHER

## 2024-03-08 RX ORDER — BUPRENORPHINE 2 MG/1
4 TABLET SUBLINGUAL ONCE
Status: DISCONTINUED | OUTPATIENT
Start: 2024-03-08 | End: 2024-03-08 | Stop reason: HOSPADM

## 2024-03-08 RX ORDER — NALOXONE HYDROCHLORIDE 4 MG/.1ML
4 SPRAY NASAL AS NEEDED
Qty: 1 EACH | Refills: 0 | Status: ACTIVE
Start: 2024-03-08 | End: 2024-03-09

## 2024-03-08 ASSESSMENT — PAIN - FUNCTIONAL ASSESSMENT: PAIN_FUNCTIONAL_ASSESSMENT: 0-10

## 2024-03-08 ASSESSMENT — COLUMBIA-SUICIDE SEVERITY RATING SCALE - C-SSRS
1. IN THE PAST MONTH, HAVE YOU WISHED YOU WERE DEAD OR WISHED YOU COULD GO TO SLEEP AND NOT WAKE UP?: NO
6. HAVE YOU EVER DONE ANYTHING, STARTED TO DO ANYTHING, OR PREPARED TO DO ANYTHING TO END YOUR LIFE?: NO
2. HAVE YOU ACTUALLY HAD ANY THOUGHTS OF KILLING YOURSELF?: NO

## 2024-03-08 ASSESSMENT — PAIN SCALES - GENERAL: PAINLEVEL_OUTOF10: 7

## 2024-03-08 NOTE — ED PROVIDER NOTES
Limitations to History: None  Additional History Obtained from: None    HPI:    Patient is presenting to the emergency department due to body aches and chills.  States he has been out of his Suboxone for the last day and a half.  Typically takes 12 mg/day.  Has had the start of withdrawal symptoms, called his outpatient provider referred him to the emergency department.  Denies any other use in between his last Suboxone dose and now.  States that the 12 mg dose typically controls his symptoms without issue.  Denies any other complaints or concerns at this time.    ------------------------------------------------------------------------------------------------------------------------------------------  Physical Exam:    ED Triage Vitals [03/08/24 0910]   Temperature Heart Rate Respirations BP   35.6 °C (96.1 °F) 78 14 124/66      Pulse Ox Temp Source Heart Rate Source Patient Position   100 % Oral Monitor Sitting      BP Location FiO2 (%)     Right arm --        VS: As documented in the triage note and EMR flowsheet from this visit were reviewed.  General: Well appearing. No acute distress.   Eyes: Pupils round and reactive. No scleral icterus. No conjunctival injection  HENT: Atraumatic. Normocephalic. Moist mucous membranes. Trachea midline  CV: RRR, No MRG. No pedal edema appreciated.  Resp: Clear to auscultation bilaterally. Non-labored.    GI: Soft, nontender to palpation. Nondistended. No guarding, rigidity or rebound  Skin: Warm, dry, intact. No systemic rashes or lesions appreciated.  Extremities: No deformities or pain out of proportion; pulses intact   Neuro: Alert. No focal motor or sensory deficits observed. Speech fluent. Answers questions appropriately.   Psych: Appropriate. Kempt.    ------------------------------------------------------------------------------------------------------------------------------------------    Medical Decision Making  Patient is presenting to the emergency department due  to concern for being out of his medications leading to withdrawal.  On exam the patient is awake and alert, well-appearing.  He has no objective signs of withdrawal but is reporting subjective chills, body aches, nausea and rhinorrhea.  Patient denies any focal treatment in between his last dose of Suboxone now.  Will give the patient his home dose and a new prescription until he is able to follow-up with his outpatient provider.  Patient given a home dose of naloxone.  Discharged in stable condition.      External Records Reviewed: I reviewed recent and relevant outside records including: HIE/Community Record  Escalation of Care: Appropriate for Discharge per ED course/MDM  Social Determinants Affecting Care:Multiple chronic illnesses  Prescription Drug Consideration: Suboxone, naloxone      Objective Data  I have independently interpreted the following labs, imaging studies and MDM added to ED Course  Labs Reviewed - No data to display    No orders to display       ED Course  ED Course as of 03/13/24 0136   Fri Mar 08, 2024   1042 Out of suboxone. Takes 12/3. Last dose was 1.5 days ago. Reporting runny nose, congestion, body aches and nausea. HD stable on exam. Will give dose here, send rx to [facility]. Pt has follow up on 4/11. Refill given to cover until appointment. Pt given return precautions  [LP]      ED Course User Index  [LP] Amarilis Finch DO         Diagnoses as of 03/13/24 0136   Opioid use disorder       Procedure  Procedures    Disposition: discharged    Amarilis Finch DO  Emergency Medicine  Medical Toxicology     Amarilis Finch DO  03/13/24 0136

## 2024-03-09 ENCOUNTER — PHARMACY VISIT (OUTPATIENT)
Dept: PHARMACY | Facility: CLINIC | Age: 34
End: 2024-03-09
Payer: MEDICARE

## 2024-04-29 ENCOUNTER — PHARMACY VISIT (OUTPATIENT)
Dept: PHARMACY | Facility: CLINIC | Age: 34
End: 2024-04-29
Payer: MEDICARE

## 2024-04-29 ENCOUNTER — HOSPITAL ENCOUNTER (EMERGENCY)
Facility: HOSPITAL | Age: 34
Discharge: HOME | End: 2024-04-29
Attending: EMERGENCY MEDICINE
Payer: COMMERCIAL

## 2024-04-29 VITALS
SYSTOLIC BLOOD PRESSURE: 150 MMHG | DIASTOLIC BLOOD PRESSURE: 72 MMHG | HEIGHT: 69 IN | HEART RATE: 73 BPM | BODY MASS INDEX: 20.73 KG/M2 | WEIGHT: 140 LBS | TEMPERATURE: 97.7 F | OXYGEN SATURATION: 100 % | RESPIRATION RATE: 16 BRPM

## 2024-04-29 DIAGNOSIS — F11.90 OPIOID USE DISORDER: ICD-10-CM

## 2024-04-29 DIAGNOSIS — F11.91 OPIOID USE DISORDER IN REMISSION: Primary | ICD-10-CM

## 2024-04-29 PROCEDURE — 99284 EMERGENCY DEPT VISIT MOD MDM: CPT | Performed by: EMERGENCY MEDICINE

## 2024-04-29 PROCEDURE — 99283 EMERGENCY DEPT VISIT LOW MDM: CPT

## 2024-04-29 PROCEDURE — RXMED WILLOW AMBULATORY MEDICATION CHARGE

## 2024-04-29 PROCEDURE — 2500000002 HC RX 250 W HCPCS SELF ADMINISTERED DRUGS (ALT 637 FOR MEDICARE OP, ALT 636 FOR OP/ED): Mod: SE | Performed by: STUDENT IN AN ORGANIZED HEALTH CARE EDUCATION/TRAINING PROGRAM

## 2024-04-29 RX ORDER — BUPRENORPHINE AND NALOXONE 12; 3 MG/1; MG/1
FILM, SOLUBLE BUCCAL; SUBLINGUAL
Qty: 14 FILM | Refills: 0 | OUTPATIENT
Start: 2024-04-29 | End: 2024-05-23 | Stop reason: SDUPTHER

## 2024-04-29 RX ORDER — BUPRENORPHINE AND NALOXONE 4; 1 MG/1; MG/1
1 FILM, SOLUBLE BUCCAL; SUBLINGUAL ONCE
Status: COMPLETED | OUTPATIENT
Start: 2024-04-29 | End: 2024-04-29

## 2024-04-29 RX ORDER — BUPRENORPHINE AND NALOXONE 12; 3 MG/1; MG/1
1 FILM, SOLUBLE BUCCAL; SUBLINGUAL DAILY
Qty: 14 FILM | Refills: 0 | Status: SHIPPED | OUTPATIENT
Start: 2024-04-29 | End: 2024-05-23 | Stop reason: SDUPTHER

## 2024-04-29 RX ORDER — BUPRENORPHINE HYDROCHLORIDE AND NALOXONE HYDROCHLORIDE DIHYDRATE 8; 2 MG/1; MG/1
1 TABLET SUBLINGUAL ONCE
Status: COMPLETED | OUTPATIENT
Start: 2024-04-29 | End: 2024-04-29

## 2024-04-29 RX ADMIN — BUPRENORPHINE AND NALOXONE 1 FILM: 4; 1 FILM BUCCAL; SUBLINGUAL at 10:32

## 2024-04-29 RX ADMIN — BUPRENORPHINE AND NALOXONE 1 TABLET: 8; 2 TABLET SUBLINGUAL at 10:32

## 2024-04-29 ASSESSMENT — PAIN SCALES - GENERAL
PAINLEVEL_OUTOF10: 6
PAINLEVEL_OUTOF10: 7

## 2024-04-29 ASSESSMENT — PAIN - FUNCTIONAL ASSESSMENT: PAIN_FUNCTIONAL_ASSESSMENT: 0-10

## 2024-04-29 NOTE — ED PROVIDER NOTES
HPI   Chief Complaint   Patient presents with    Generalized Body Aches       HPI  33-year-old male with history of opioid use disorder who presents for Suboxone refill.  Patient reports general aches and intermittent chills that he reports are similar in character to how he is withdrawn previously.  He states his last Suboxone dose was 2 days prior.  He states he does not follow-up with his primary care doctor for another month.  Denies any fevers, cough.                  [place] Coma Scale Score: 15                     Patient History   Past Medical History:   Diagnosis Date    Crushing injury of finger 04/12/2011    Open fracture of phalanx or phalanges of hand 04/12/2011    Opioid intoxication delirium (Multi) 10/18/2022    Seizure-like activity (Multi) 10/18/2022     Past Surgical History:   Procedure Laterality Date    CT ANGIO NECK  5/24/2023    CT NECK ANGIO W AND WO IV CONTRAST 5/24/2023 [facility]    CT HEAD ANGIO W AND WO IV CONTRAST  5/24/2023    CT HEAD ANGIO W AND WO IV CONTRAST 5/24/2023 [facility]     No family history on file.  Social History     Tobacco Use    Smoking status: Every Day     Types: Cigarettes    Smokeless tobacco: Never   Substance Use Topics    Alcohol use: Never    Drug use: Not Currently     Types: Marijuana       Physical Exam   ED Triage Vitals [04/29/24 0914]   Temperature Heart Rate Respirations BP   36.5 °C (97.7 °F) 73 16 150/72      Pulse Ox Temp Source Heart Rate Source Patient Position   100 % Temporal Monitor --      BP Location FiO2 (%)     -- 21 %       Physical Exam  Vitals and nursing note reviewed.   Constitutional:       Appearance: Normal appearance.   HENT:      Head: Normocephalic.      Mouth/Throat:      Mouth: Mucous membranes are moist.   Eyes:      Conjunctiva/sclera: Conjunctivae normal.   Cardiovascular:      Rate and Rhythm: Normal rate.   Pulmonary:      Effort: Pulmonary effort is normal.   Abdominal:      General: Abdomen is flat.   Neurological:      Mental  Status: He is alert and oriented to person, place, and time.   Psychiatric:         Mood and Affect: Mood normal.         ED Course & MDM   Diagnoses as of 04/29/24 1033   Opioid use disorder in remission       Medical Decision Making  33-year-old male with history of opioid use disorder on Suboxone who presents for Suboxone refill.  On exam, patient's vitals are normal, he is in no acute distress and nontoxic-appearing, appears clinically sober, neurologically intact, no outward signs of withdrawal.  Patient was given first dose of Suboxone here in the ED and given 2-week prescription as well as instructions to follow-up with medication assisted therapy clinic as an outpatient.  Patient was discharged home in stable condition.    Procedure  Procedures     Geovanni Muniz,   Resident  04/29/24 1030

## 2024-05-23 ENCOUNTER — HOSPITAL ENCOUNTER (EMERGENCY)
Facility: HOSPITAL | Age: 34
Discharge: HOME | End: 2024-05-23
Attending: EMERGENCY MEDICINE
Payer: COMMERCIAL

## 2024-05-23 ENCOUNTER — PHARMACY VISIT (OUTPATIENT)
Dept: PHARMACY | Facility: CLINIC | Age: 34
End: 2024-05-23
Payer: MEDICARE

## 2024-05-23 VITALS
HEART RATE: 64 BPM | TEMPERATURE: 97.7 F | WEIGHT: 145 LBS | SYSTOLIC BLOOD PRESSURE: 126 MMHG | DIASTOLIC BLOOD PRESSURE: 79 MMHG | OXYGEN SATURATION: 100 % | RESPIRATION RATE: 18 BRPM | HEIGHT: 69 IN | BODY MASS INDEX: 21.48 KG/M2

## 2024-05-23 DIAGNOSIS — F11.90 OPIOID USE DISORDER: ICD-10-CM

## 2024-05-23 DIAGNOSIS — Z76.0 MEDICATION REFILL: Primary | ICD-10-CM

## 2024-05-23 PROCEDURE — 99284 EMERGENCY DEPT VISIT MOD MDM: CPT

## 2024-05-23 PROCEDURE — 99281 EMR DPT VST MAYX REQ PHY/QHP: CPT

## 2024-05-23 PROCEDURE — RXMED WILLOW AMBULATORY MEDICATION CHARGE

## 2024-05-23 RX ORDER — BUPRENORPHINE AND NALOXONE 12; 3 MG/1; MG/1
1 FILM, SOLUBLE BUCCAL; SUBLINGUAL DAILY
Qty: 14 FILM | Refills: 0 | OUTPATIENT
Start: 2024-05-23

## 2024-05-23 RX ORDER — BUPRENORPHINE AND NALOXONE 12; 3 MG/1; MG/1
1 FILM, SOLUBLE BUCCAL; SUBLINGUAL DAILY
Qty: 14 FILM | Refills: 0 | Status: SHIPPED | OUTPATIENT
Start: 2024-05-23 | End: 2024-06-06

## 2024-05-23 ASSESSMENT — COLUMBIA-SUICIDE SEVERITY RATING SCALE - C-SSRS
2. HAVE YOU ACTUALLY HAD ANY THOUGHTS OF KILLING YOURSELF?: NO
1. IN THE PAST MONTH, HAVE YOU WISHED YOU WERE DEAD OR WISHED YOU COULD GO TO SLEEP AND NOT WAKE UP?: NO
6. HAVE YOU EVER DONE ANYTHING, STARTED TO DO ANYTHING, OR PREPARED TO DO ANYTHING TO END YOUR LIFE?: NO

## 2024-05-23 NOTE — ED PROVIDER NOTES
"HPI   Chief Complaint   Patient presents with    Med Refill       Patient is a 33-year-old male with a past medical history significant for opioid use disorder presenting to the ED requesting a Suboxone refill.  Patient states he took his last dose of Suboxone yesterday.  He endorses subjective symptoms of withdrawal including anxiety, chills, and his \"normal withdrawal symptoms.\"  Patient states he needs a refill until he sees his PCP which is scheduled for June 27.  He denies any recent opioid use.  He voices no other concerns at this time.                [place] Coma Scale Score: 15                     Patient History   Past Medical History:   Diagnosis Date    Crushing injury of finger 04/12/2011    Open fracture of phalanx or phalanges of hand 04/12/2011    Opioid intoxication delirium (Multi) 10/18/2022    Seizure-like activity (Multi) 10/18/2022     Past Surgical History:   Procedure Laterality Date    CT ANGIO NECK  5/24/2023    CT NECK ANGIO W AND WO IV CONTRAST 5/24/2023 [facility]    CT HEAD ANGIO W AND WO IV CONTRAST  5/24/2023    CT HEAD ANGIO W AND WO IV CONTRAST 5/24/2023 [facility]     No family history on file.  Social History     Tobacco Use    Smoking status: Every Day     Types: Cigarettes    Smokeless tobacco: Never   Substance Use Topics    Alcohol use: Never    Drug use: Not Currently     Types: Marijuana       Physical Exam   ED Triage Vitals [05/23/24 1145]   Temperature Heart Rate Respirations BP   36.5 °C (97.7 °F) 64 18 126/79      Pulse Ox Temp src Heart Rate Source Patient Position   100 % -- -- --      BP Location FiO2 (%)     -- --       Physical Exam  Vitals reviewed.   Constitutional:       General: He is not in acute distress.     Appearance: He is not ill-appearing.   HENT:      Head: Normocephalic and atraumatic.      Nose: Nose normal. No congestion or rhinorrhea.   Cardiovascular:      Rate and Rhythm: Normal rate and regular rhythm.   Pulmonary:      Effort: Pulmonary effort is " "normal.      Breath sounds: Normal breath sounds.   Abdominal:      Palpations: Abdomen is soft.      Tenderness: There is no abdominal tenderness.   Neurological:      General: No focal deficit present.      Mental Status: He is alert and oriented to person, place, and time.         ED Course & MDM   Diagnoses as of 05/23/24 1321   Medication refill       Medical Decision Making  Patient is a 33-year-old male with a past medical history significant for opioid use disorder presenting to the ED requesting a Suboxone refill.  History was obtained from the patient.  States he took his last dose of Suboxone yesterday and is currently experiencing his \"normal withdrawal symptoms.\"  He is requesting a refill up until he sees his PCP at the end of June.  Denies other recent opioid use.  Has no other concerns at this time.  On physical exam, patient is resting comfortably and in no apparent distress.  No outward signs/symptoms of withdrawal.  Unremarkable physical exam as noted above.  Patient is afebrile and vital signs are stable.    Review of patient's chart and OAARS reveals his last Suboxone was refilled at the end of April which makes him due at this time.  He did not want a dose while here in the ED.  Patient was written a prescription for a refill for the next few weeks.  This should be able to hold him over until his scheduled follow-up with his PCP in June.  Patient was discharged home from the ED in stable condition and all of his questions were answered to satisfaction.      Procedure  Procedures     Shoshana Lee PA-C  05/23/24 1328    "

## 2024-06-20 ENCOUNTER — HOSPITAL ENCOUNTER (EMERGENCY)
Facility: HOSPITAL | Age: 34
Discharge: HOME | End: 2024-06-20
Payer: COMMERCIAL

## 2024-06-20 ENCOUNTER — PHARMACY VISIT (OUTPATIENT)
Dept: PHARMACY | Facility: CLINIC | Age: 34
End: 2024-06-20
Payer: MEDICARE

## 2024-06-20 VITALS
HEART RATE: 58 BPM | HEIGHT: 69 IN | WEIGHT: 145 LBS | SYSTOLIC BLOOD PRESSURE: 120 MMHG | DIASTOLIC BLOOD PRESSURE: 82 MMHG | OXYGEN SATURATION: 100 % | RESPIRATION RATE: 18 BRPM | TEMPERATURE: 98.4 F | BODY MASS INDEX: 21.48 KG/M2

## 2024-06-20 DIAGNOSIS — F11.93 OPIATE WITHDRAWAL (MULTI): Primary | ICD-10-CM

## 2024-06-20 DIAGNOSIS — F11.90 OPIOID USE DISORDER: ICD-10-CM

## 2024-06-20 PROCEDURE — 99281 EMR DPT VST MAYX REQ PHY/QHP: CPT

## 2024-06-20 PROCEDURE — RXMED WILLOW AMBULATORY MEDICATION CHARGE

## 2024-06-20 PROCEDURE — 99284 EMERGENCY DEPT VISIT MOD MDM: CPT

## 2024-06-20 RX ORDER — BUPRENORPHINE AND NALOXONE 12; 3 MG/1; MG/1
1 FILM, SOLUBLE BUCCAL; SUBLINGUAL DAILY
Qty: 7 FILM | Refills: 0 | Status: SHIPPED | OUTPATIENT
Start: 2024-06-20 | End: 2024-06-27

## 2024-06-20 RX ORDER — ACETAMINOPHEN 325 MG/1
975 TABLET ORAL ONCE
Status: DISCONTINUED | OUTPATIENT
Start: 2024-06-20 | End: 2024-06-20

## 2024-06-20 ASSESSMENT — LIFESTYLE VARIABLES: TOTAL_SCORE: 3

## 2024-06-20 ASSESSMENT — PAIN - FUNCTIONAL ASSESSMENT: PAIN_FUNCTIONAL_ASSESSMENT: 0-10

## 2024-06-20 ASSESSMENT — PAIN SCALES - GENERAL: PAINLEVEL_OUTOF10: 6

## 2024-06-20 NOTE — ED PROVIDER NOTES
HPI   Chief Complaint   Patient presents with    Generalized Body Aches       33-year-old male with history of substance use disorder with opioids on Suboxone presents for chief complaint of withdrawal symptoms.  Endorses chills and myalgia for 2 days.  States he feels that he is withdrawing.  States that he is out of his prescription and worries that he might withdraw further and possibly relapsed.  States that he did find 1 Suboxone in his car but he is completely out otherwise.  He took that and is starting to improve but still endorses being out of his prescription and endorses withdrawal symptoms still.  Denies any other complaints.  No chest pain or dyspnea.  No nausea or vomiting.  No changes in urination or bowel movement.  No rashes or swelling.  No abdominal pain.  No headache or dizziness.                          No data recorded                   Patient History   Past Medical History:   Diagnosis Date    Crushing injury of finger 04/12/2011    Open fracture of phalanx or phalanges of hand 04/12/2011    Opioid intoxication delirium (Multi) 10/18/2022    Seizure-like activity (Multi) 10/18/2022     Past Surgical History:   Procedure Laterality Date    CT ANGIO NECK  5/24/2023    CT NECK ANGIO W AND WO IV CONTRAST 5/24/2023 [facility]    CT HEAD ANGIO W AND WO IV CONTRAST  5/24/2023    CT HEAD ANGIO W AND WO IV CONTRAST 5/24/2023 [facility]     No family history on file.  Social History     Tobacco Use    Smoking status: Every Day     Types: Cigarettes    Smokeless tobacco: Never   Substance Use Topics    Alcohol use: Never    Drug use: Not Currently     Types: Marijuana       Physical Exam   ED Triage Vitals [06/20/24 0858]   Temperature Heart Rate Respirations BP   36.9 °C (98.4 °F) 71 16 121/67      Pulse Ox Temp src Heart Rate Source Patient Position   100 % -- -- --      BP Location FiO2 (%)     -- --       Physical Exam  Constitutional:       Appearance: Normal appearance.   HENT:      Head: Normocephalic  and atraumatic.      Mouth/Throat:      Mouth: Mucous membranes are moist.      Pharynx: Oropharynx is clear.   Eyes:      Extraocular Movements: Extraocular movements intact.      Conjunctiva/sclera: Conjunctivae normal.      Pupils: Pupils are equal, round, and reactive to light.   Cardiovascular:      Rate and Rhythm: Normal rate and regular rhythm.      Pulses: Normal pulses.      Heart sounds: Normal heart sounds.   Pulmonary:      Effort: Pulmonary effort is normal.      Breath sounds: Normal breath sounds.   Abdominal:      General: Abdomen is flat.      Palpations: Abdomen is soft.   Musculoskeletal:         General: Normal range of motion.      Cervical back: Normal range of motion and neck supple.   Skin:     General: Skin is warm and dry.      Capillary Refill: Capillary refill takes less than 2 seconds.   Neurological:      General: No focal deficit present.      Mental Status: He is alert and oriented to person, place, and time.   Psychiatric:         Mood and Affect: Mood normal.         Behavior: Behavior normal.         Thought Content: Thought content normal.         Judgment: Judgment normal.         ED Course & MDM   Diagnoses as of 06/20/24 1012   Opiate withdrawal (Multi)       Medical Decision Making  Vital signs reviewed, unremarkable at this time.  Patient is well-appearing in no apparent distress.  Speaks full sentences without difficulty.  He does endorse some withdrawal symptoms.  They are improving after taking 1 dose of Suboxone but states that he has no more left and would like a prescription so the can further avoid withdrawing/relapsing.  Reviewing orders, his last prescription was filled on 5/23/2024 for a total of 14 days.  States that he has follow-up with his appointments is been spread out so that he has been running out of his meds.  States that he will attempt to correct this so that he does not have to come back to the ED again.  Advised that this would be best.  Encouraged  him to return with any new or worsening symptoms and to follow-up with primary care as well as Suboxone clinic.  States that he does have Narcan at home and does not need new prescription for that.  He is in agreement with this plan.  Discharged in stable condition.        Procedure  Procedures     Haider Duke, DELON-CNP  06/20/24 1015

## 2024-06-20 NOTE — ED TRIAGE NOTES
Generalized body aches for 2 days. Denies sick contacts. Denies fevers at home    Last took suboxone 2 days ago and is worried he is withdrawing.

## 2024-07-02 ENCOUNTER — HOSPITAL ENCOUNTER (EMERGENCY)
Facility: HOSPITAL | Age: 34
Discharge: HOME | End: 2024-07-02
Payer: COMMERCIAL

## 2024-07-02 ENCOUNTER — PHARMACY VISIT (OUTPATIENT)
Dept: PHARMACY | Facility: CLINIC | Age: 34
End: 2024-07-02
Payer: MEDICARE

## 2024-07-02 VITALS
BODY MASS INDEX: 20.73 KG/M2 | HEIGHT: 69 IN | RESPIRATION RATE: 17 BRPM | SYSTOLIC BLOOD PRESSURE: 131 MMHG | OXYGEN SATURATION: 100 % | TEMPERATURE: 97.2 F | DIASTOLIC BLOOD PRESSURE: 78 MMHG | HEART RATE: 78 BPM | WEIGHT: 140 LBS

## 2024-07-02 DIAGNOSIS — F11.93 OPIOID WITHDRAWAL (MULTI): Primary | ICD-10-CM

## 2024-07-02 PROCEDURE — 99284 EMERGENCY DEPT VISIT MOD MDM: CPT | Performed by: NURSE PRACTITIONER

## 2024-07-02 PROCEDURE — RXMED WILLOW AMBULATORY MEDICATION CHARGE

## 2024-07-02 PROCEDURE — 99283 EMERGENCY DEPT VISIT LOW MDM: CPT

## 2024-07-02 RX ORDER — BUPRENORPHINE AND NALOXONE 12; 3 MG/1; MG/1
1 FILM, SOLUBLE BUCCAL; SUBLINGUAL DAILY
Qty: 30 FILM | Refills: 0 | Status: SHIPPED | OUTPATIENT
Start: 2024-07-02 | End: 2024-08-01

## 2024-07-02 ASSESSMENT — ENCOUNTER SYMPTOMS
FEVER: 0
CHILLS: 1
WHEEZING: 0
SORE THROAT: 0
HEADACHES: 0
ABDOMINAL PAIN: 0
TREMORS: 0
RHINORRHEA: 0
DIZZINESS: 0
SHORTNESS OF BREATH: 0
NAUSEA: 1
VOMITING: 0
COUGH: 0

## 2024-07-02 ASSESSMENT — PAIN SCALES - GENERAL: PAINLEVEL_OUTOF10: 0 - NO PAIN

## 2024-07-02 ASSESSMENT — COLUMBIA-SUICIDE SEVERITY RATING SCALE - C-SSRS
1. IN THE PAST MONTH, HAVE YOU WISHED YOU WERE DEAD OR WISHED YOU COULD GO TO SLEEP AND NOT WAKE UP?: NO
2. HAVE YOU ACTUALLY HAD ANY THOUGHTS OF KILLING YOURSELF?: NO
6. HAVE YOU EVER DONE ANYTHING, STARTED TO DO ANYTHING, OR PREPARED TO DO ANYTHING TO END YOUR LIFE?: NO

## 2024-07-02 ASSESSMENT — LIFESTYLE VARIABLES: TOTAL_SCORE: 4

## 2024-07-02 ASSESSMENT — PAIN - FUNCTIONAL ASSESSMENT: PAIN_FUNCTIONAL_ASSESSMENT: 0-10

## 2024-07-02 NOTE — ED TRIAGE NOTES
Pt to ED with c/o possible withdrawal. Pt states he is out of his suboxone. Last dose, Saturday. Pt endorsing body aches.

## 2024-07-02 NOTE — PROGRESS NOTES
Davin Martínez is a 33 y.o. male in ED for Suboxone    Assessment/Plan   SW consulted by provider for referral to OP Suboxone clinics. Collaborated with provider to review Pt's last visit. Observation made Pt is taking his script twice per day despite it being scripted for once per day. Provider to address with Pt.  Met with Pt individually. He said he is coming to family medicine at  but missed his last appointment. Pt stated he is taking his medication twice per day due to not being able to sleep at night without it. Pt stated he is having withdrawal symptoms otherwise. Pt stated he has an OP visit end of the month with family medicine.  SW reviewed Pt's chart and observed he was last seen by family medicine in February with a phone communication. He had no future appointments. SW encouraged Pt to reach out to the office again to schedule. List of Suboxone clinics given to him in the area as published [facility]. Report given to provider.     MIRTA Kelley

## 2024-07-02 NOTE — ED PROVIDER NOTES
HPI   Chief Complaint   Patient presents with    Med Refill       HPI33 year old male with hx of substance abuse (opiods) presents to the Emergency Department today requesting refill of suboxone. States that he took his last dose on Saturday. Endorses subjective body aches, chills and fatigue with some joint aching. States that his family medicine PCP in [facility] typically prescribes his Suboxone, however he missed his appointment last month and has come to the ED for prescriptions. Upcoming appointment with PCP scheduled for end of July/early August. Reports that he has been taking his Suboxone 2x/day. Denies dizziness, headache, vision changes, CP, SOB, vomiting, abd pain, paresthesias, weakness, anxiety. Does have Narcan available at home       Review of Systems   Constitutional:  Positive for chills. Negative for fever.   HENT:  Negative for congestion, ear pain, rhinorrhea and sore throat.    Eyes:  Negative for visual disturbance.   Respiratory:  Negative for cough, shortness of breath and wheezing.    Cardiovascular:  Negative for chest pain.   Gastrointestinal:  Positive for nausea. Negative for abdominal pain and vomiting.   Skin:  Negative for rash.   Neurological:  Negative for dizziness, tremors and headaches.              Clinical Opiate Withdrawal Scale Total Score: 4     [place] Coma Scale Score: 15                     Patient History   Past Medical History:   Diagnosis Date    Crushing injury of finger 04/12/2011    Open fracture of phalanx or phalanges of hand 04/12/2011    Opioid intoxication delirium (Multi) 10/18/2022    Seizure-like activity (Multi) 10/18/2022     Past Surgical History:   Procedure Laterality Date    CT ANGIO NECK  5/24/2023    CT NECK ANGIO W AND WO IV CONTRAST 5/24/2023 [facility]    CT HEAD ANGIO W AND WO IV CONTRAST  5/24/2023    CT HEAD ANGIO W AND WO IV CONTRAST 5/24/2023 [facility] CT     No family history on file.  Social History     Tobacco Use    Smoking status: Every Day      Types: Cigarettes    Smokeless tobacco: Never   Substance Use Topics    Alcohol use: Never    Drug use: Not Currently     Types: Marijuana       Physical Exam   ED Triage Vitals [07/02/24 0922]   Temperature Heart Rate Respirations BP   36.2 °C (97.2 °F) 78 17 131/78      Pulse Ox Temp Source Heart Rate Source Patient Position   100 % Temporal Monitor Sitting      BP Location FiO2 (%)     Left arm --       Physical Exam  Vitals reviewed.   Constitutional:       Appearance: Normal appearance. He is not ill-appearing.   HENT:      Head: Normocephalic and atraumatic.      Nose: Nose normal.      Mouth/Throat:      Mouth: Mucous membranes are moist.      Pharynx: No oropharyngeal exudate or posterior oropharyngeal erythema.   Eyes:      Extraocular Movements: Extraocular movements intact.      Pupils: Pupils are equal, round, and reactive to light.   Cardiovascular:      Rate and Rhythm: Normal rate and regular rhythm.      Heart sounds: Normal heart sounds.   Pulmonary:      Effort: Pulmonary effort is normal. No respiratory distress.      Breath sounds: Normal breath sounds. No wheezing, rhonchi or rales.   Musculoskeletal:      Cervical back: Neck supple.   Lymphadenopathy:      Cervical: No cervical adenopathy.   Skin:     General: Skin is warm and dry.   Neurological:      General: No focal deficit present.      Mental Status: He is alert and oriented to person, place, and time.      Sensory: No sensory deficit.      Motor: No weakness.      Gait: Gait normal.         ED Course & MDM   Diagnoses as of 07/02/24 1031   Opioid withdrawal (Multi)       Medical Decision Making  33 year old male with hx of opioid withdrawal (takes Suboxone) presents to the ED requesting refill on Suboxone.  has taken last dose on Saturday. Endorses some subjective withdrawal symptoms including body aches, joint pain, chills, nausea x 3 days. Missed recent follow up with PCP last month, [facility] has a scheduled appointment at the end  of this month with primary care. Patient is well appearing, nontoxic resting comfortably without distress. Appears clinically sober, neurologically intact without obvious signs of withdrawal. Patient given 30 day supply of suboxone until able to follow up with PCP. ED SW at bedside who did provide outpatient suboxone clinic information to patient. Advised to return to the ED for any new or worsening symptoms or for any other concerns and follow up with PCP as scheduled on July 18. He did verbalize understanding and remains in stable condition at the time of discharge.    Procedure  Procedures     DELON Arryoo-PERRY  07/02/24 1041       DELON Arroyo-PERRY  07/02/24 1044

## 2024-07-18 ENCOUNTER — APPOINTMENT (OUTPATIENT)
Dept: PRIMARY CARE | Facility: CLINIC | Age: 34
End: 2024-07-18
Payer: COMMERCIAL

## 2024-09-10 ENCOUNTER — HOSPITAL ENCOUNTER (EMERGENCY)
Facility: HOSPITAL | Age: 34
Discharge: HOME | End: 2024-09-10
Payer: COMMERCIAL

## 2024-09-10 ENCOUNTER — PHARMACY VISIT (OUTPATIENT)
Dept: PHARMACY | Facility: CLINIC | Age: 34
End: 2024-09-10
Payer: MEDICARE

## 2024-09-10 VITALS
OXYGEN SATURATION: 99 % | HEIGHT: 69 IN | HEART RATE: 70 BPM | TEMPERATURE: 97.9 F | BODY MASS INDEX: 21.48 KG/M2 | DIASTOLIC BLOOD PRESSURE: 69 MMHG | SYSTOLIC BLOOD PRESSURE: 118 MMHG | RESPIRATION RATE: 16 BRPM | WEIGHT: 145 LBS

## 2024-09-10 DIAGNOSIS — Z76.0 ENCOUNTER FOR MEDICATION REFILL: Primary | ICD-10-CM

## 2024-09-10 DIAGNOSIS — F11.93 OPIOID WITHDRAWAL (MULTI): ICD-10-CM

## 2024-09-10 PROCEDURE — RXMED WILLOW AMBULATORY MEDICATION CHARGE

## 2024-09-10 PROCEDURE — 99283 EMERGENCY DEPT VISIT LOW MDM: CPT

## 2024-09-10 PROCEDURE — 99283 EMERGENCY DEPT VISIT LOW MDM: CPT | Performed by: NURSE PRACTITIONER

## 2024-09-10 PROCEDURE — 99281 EMR DPT VST MAYX REQ PHY/QHP: CPT

## 2024-09-10 RX ORDER — BUPRENORPHINE AND NALOXONE 12; 3 MG/1; MG/1
1 FILM, SOLUBLE BUCCAL; SUBLINGUAL DAILY
Qty: 30 FILM | Refills: 0 | Status: SHIPPED | OUTPATIENT
Start: 2024-09-10 | End: 2024-10-10

## 2024-09-10 ASSESSMENT — ENCOUNTER SYMPTOMS
ABDOMINAL PAIN: 0
CHILLS: 0
FEVER: 0
WEAKNESS: 0
NAUSEA: 0
COUGH: 0
DIARRHEA: 0
SHORTNESS OF BREATH: 0
HEADACHES: 0
VOMITING: 0
NUMBNESS: 0
DIZZINESS: 0

## 2024-09-10 NOTE — ED PROVIDER NOTES
HPI   Chief Complaint   Patient presents with    Med Refill     Requesting rx for suboxone       HPI 33 year old male with hx of substance abuse (opiods) presents to the Emergency Department today requesting Suboxone refill. He reports taking his last dose yesterday. States that his PCP in family medicine typically prescribes his Suboxone , however he recently missed his appointment this month. He denies any withdrawal symptoms. Reports feeling well, denies any dizziness, headaches, visual changes, CP, SOB, n/v/d, anxiety or paresthesias. Has rescheduled appointment with PCP scheduled for 10/23. Does have Narcan available at home.    Review of Systems   Constitutional:  Negative for chills and fever.   HENT:  Negative for congestion.    Respiratory:  Negative for cough and shortness of breath.    Cardiovascular:  Negative for chest pain.   Gastrointestinal:  Negative for abdominal pain, diarrhea, nausea and vomiting.   Neurological:  Negative for dizziness, weakness, numbness and headaches.         Patient History   Past Medical History:   Diagnosis Date    Crushing injury of finger 04/12/2011    Open fracture of phalanx or phalanges of hand 04/12/2011    Opioid intoxication delirium (Multi) 10/18/2022    Seizure-like activity (Multi) 10/18/2022     Past Surgical History:   Procedure Laterality Date    CT ANGIO NECK  5/24/2023    CT NECK ANGIO W AND WO IV CONTRAST 5/24/2023 [facility]    CT HEAD ANGIO W AND WO IV CONTRAST  5/24/2023    CT HEAD ANGIO W AND WO IV CONTRAST 5/24/2023 [facility] CT     No family history on file.  Social History     Tobacco Use    Smoking status: Every Day     Types: Cigarettes    Smokeless tobacco: Never   Substance Use Topics    Alcohol use: Never    Drug use: Not Currently     Types: Marijuana       Physical Exam   ED Triage Vitals [09/10/24 1130]   Temperature Heart Rate Respirations BP   36.6 °C (97.9 °F) 70 16 118/69      Pulse Ox Temp src Heart Rate Source Patient Position   99 % -- -- --       BP Location FiO2 (%)     -- --       Physical Exam  Vitals reviewed.   Constitutional:       Appearance: Normal appearance. He is not ill-appearing.   HENT:      Head: Normocephalic and atraumatic.      Mouth/Throat:      Mouth: Mucous membranes are moist.      Pharynx: No oropharyngeal exudate or posterior oropharyngeal erythema.   Eyes:      Extraocular Movements: Extraocular movements intact.      Pupils: Pupils are equal, round, and reactive to light.   Cardiovascular:      Rate and Rhythm: Normal rate and regular rhythm.      Heart sounds: Normal heart sounds.   Pulmonary:      Effort: Pulmonary effort is normal.      Breath sounds: Normal breath sounds.   Skin:     General: Skin is warm and dry.   Neurological:      General: No focal deficit present.      Mental Status: He is alert and oriented to person, place, and time.      Sensory: No sensory deficit.      Motor: No weakness.      Gait: Gait normal.           ED Course & MDM   Diagnoses as of 09/10/24 1324   Encounter for medication refill                 No data recorded     [place] Coma Scale Score: 15 (09/10/24 1130 : Rod Leon RN)                           Medical Decision Making    33 year old male with hx of opiod abuse presents to the ED today requesting refill of Suboxone. Last dose taken was yesterday. He denies any medical complaints, no symptoms of opiod withdrawal. Patient is well appearing, nontoxic resting comfortably without distress. Neurologically intact, appears clinically sober. OARRS reviewed.  I did refill patient's Suboxone (given 30 day supply). I advised patient to follow up with his PCP within the next 2 weeks. Return precautions discussed. He did verbalize understanding and remains in stable condition at the time of discharge.  Procedure  Procedures     DELON Arroyo-PERRY  09/10/24 3715

## 2024-11-05 ENCOUNTER — HOSPITAL ENCOUNTER (EMERGENCY)
Facility: HOSPITAL | Age: 34
Discharge: HOME | End: 2024-11-05
Payer: COMMERCIAL

## 2024-11-05 ENCOUNTER — PHARMACY VISIT (OUTPATIENT)
Dept: PHARMACY | Facility: CLINIC | Age: 34
End: 2024-11-05
Payer: MEDICARE

## 2024-11-05 VITALS
DIASTOLIC BLOOD PRESSURE: 71 MMHG | TEMPERATURE: 99 F | OXYGEN SATURATION: 100 % | RESPIRATION RATE: 16 BRPM | BODY MASS INDEX: 21.48 KG/M2 | HEART RATE: 82 BPM | WEIGHT: 145 LBS | HEIGHT: 69 IN | SYSTOLIC BLOOD PRESSURE: 115 MMHG

## 2024-11-05 DIAGNOSIS — F11.93 OPIOID WITHDRAWAL (MULTI): ICD-10-CM

## 2024-11-05 DIAGNOSIS — Z76.0 MEDICATION REFILL: ICD-10-CM

## 2024-11-05 DIAGNOSIS — M79.10 MYALGIA: Primary | ICD-10-CM

## 2024-11-05 PROCEDURE — RXMED WILLOW AMBULATORY MEDICATION CHARGE

## 2024-11-05 PROCEDURE — 99284 EMERGENCY DEPT VISIT MOD MDM: CPT

## 2024-11-05 PROCEDURE — 99281 EMR DPT VST MAYX REQ PHY/QHP: CPT

## 2024-11-05 RX ORDER — BUPRENORPHINE AND NALOXONE 12; 3 MG/1; MG/1
1 FILM, SOLUBLE BUCCAL; SUBLINGUAL DAILY
Qty: 7 FILM | Refills: 0 | Status: SHIPPED | OUTPATIENT
Start: 2024-11-05 | End: 2024-11-12

## 2024-11-05 RX ORDER — BUPRENORPHINE AND NALOXONE 12; 3 MG/1; MG/1
FILM, SOLUBLE BUCCAL; SUBLINGUAL
Qty: 7 FILM | Refills: 0 | OUTPATIENT
Start: 2024-11-05

## 2024-11-05 RX ORDER — BUPRENORPHINE HYDROCHLORIDE AND NALOXONE HYDROCHLORIDE DIHYDRATE 8; 2 MG/1; MG/1
1 TABLET SUBLINGUAL ONCE
Status: DISCONTINUED | OUTPATIENT
Start: 2024-11-05 | End: 2024-11-05 | Stop reason: HOSPADM

## 2024-11-05 ASSESSMENT — LIFESTYLE VARIABLES: TOTAL_SCORE: 2

## 2024-11-05 ASSESSMENT — COLUMBIA-SUICIDE SEVERITY RATING SCALE - C-SSRS
6. HAVE YOU EVER DONE ANYTHING, STARTED TO DO ANYTHING, OR PREPARED TO DO ANYTHING TO END YOUR LIFE?: NO
1. IN THE PAST MONTH, HAVE YOU WISHED YOU WERE DEAD OR WISHED YOU COULD GO TO SLEEP AND NOT WAKE UP?: NO
2. HAVE YOU ACTUALLY HAD ANY THOUGHTS OF KILLING YOURSELF?: NO

## 2024-11-15 ENCOUNTER — HOSPITAL ENCOUNTER (EMERGENCY)
Facility: HOSPITAL | Age: 34
Discharge: HOME | End: 2024-11-15
Payer: COMMERCIAL

## 2024-11-15 ENCOUNTER — PHARMACY VISIT (OUTPATIENT)
Dept: PHARMACY | Facility: CLINIC | Age: 34
End: 2024-11-15
Payer: MEDICARE

## 2024-11-15 VITALS
TEMPERATURE: 98.2 F | DIASTOLIC BLOOD PRESSURE: 74 MMHG | BODY MASS INDEX: 21.48 KG/M2 | HEART RATE: 78 BPM | OXYGEN SATURATION: 98 % | HEIGHT: 69 IN | SYSTOLIC BLOOD PRESSURE: 122 MMHG | RESPIRATION RATE: 18 BRPM | WEIGHT: 145 LBS

## 2024-11-15 DIAGNOSIS — F11.93 OPIOID WITHDRAWAL (MULTI): ICD-10-CM

## 2024-11-15 DIAGNOSIS — F11.90 OPIOID USE: Primary | ICD-10-CM

## 2024-11-15 PROCEDURE — 99281 EMR DPT VST MAYX REQ PHY/QHP: CPT

## 2024-11-15 PROCEDURE — 99284 EMERGENCY DEPT VISIT MOD MDM: CPT | Performed by: NURSE PRACTITIONER

## 2024-11-15 PROCEDURE — RXMED WILLOW AMBULATORY MEDICATION CHARGE

## 2024-11-15 RX ORDER — BUPRENORPHINE AND NALOXONE 12; 3 MG/1; MG/1
1 FILM, SOLUBLE BUCCAL; SUBLINGUAL DAILY
Qty: 7 FILM | Refills: 0 | Status: SHIPPED | OUTPATIENT
Start: 2024-11-15 | End: 2024-11-22

## 2024-11-15 NOTE — ED PROVIDER NOTES
Emergency Department Encounter  [facility] EMERGENCY MEDICINE    Patient: Davin Martínez  MRN: [id number]  : 1990  Date of Evaluation: 11/15/2024  ED Provider: NATALIIA Hooper      Chief Complaint       Chief Complaint   Patient presents with    Chills        Limitations to History: none  Historian: patient  Records reviewed: EMR inpatient and outpatient notes, Care Everywhere    This is a 33-year-old male with a PMH of fentanyl use who presents to the emergency room stating he needs a refill of Suboxone.  Patient has a previous history of opiate use disorder and has been on Suboxone.  States that he has had a hard time getting a hold of his primary care doctor for refills.  Patient was advised to come to the emergency room today because his PCP could not get him in for an appointment. Denies any withdrawal symptoms. Last dose of Suboxone was yesterday evening.    PMH: Opoid use  PSH: Denies  Allergies: NKDA  Social HX: + Smoker, denies any alcohol use, former fentanyl use  Medications: Reviewed per EMR      ROS:     Review of Systems  14 systems reviewed and otherwise acutely negative except as in the [place].        Past History     Past Medical History:   Diagnosis Date    Crushing injury of finger 2011    Open fracture of phalanx or phalanges of hand 2011    Opioid intoxication delirium (Multi) 10/18/2022    Seizure-like activity (Multi) 10/18/2022     Past Surgical History:   Procedure Laterality Date    CT ANGIO NECK  2023    CT NECK ANGIO W AND WO IV CONTRAST 2023 [facility] CT    CT HEAD ANGIO W AND WO IV CONTRAST  2023    CT HEAD ANGIO W AND WO IV CONTRAST 2023 [facility] CT         Medications/Allergies     Current Discharge Medication List        CONTINUE these medications which have NOT CHANGED    Details   cloNIDine (Catapres) 0.2 mg tablet Take 1/2 tablets (0.1 mg) by mouth 2 times a day for 15 days.  Qty: 20 tablet, Refills: 0    Associated Diagnoses:  "Opioid withdrawal (Multi)      naloxone (Narcan) 4 mg/0.1 mL nasal spray            No Known Allergies     Physical Exam       ED Triage Vitals [11/15/24 1149]   Temperature Heart Rate Respirations BP   36.8 °C (98.2 °F) 78 18 122/74      Pulse Ox Temp Source Heart Rate Source Patient Position   98 % Oral Monitor Sitting      BP Location FiO2 (%)     Left arm --       Physical Exam:    Appearance: Alert, oriented , cooperative,  in no acute distress.     Skin: Intact,  dry skin, no lesions, rash, petechiae or purpura.     ENT: Hearing grossly intact. External auditory canals patent, tympanic membranes intact with visible landmarks. Nares patent, mucus membranes moist. Dentition without lesions. Pharynx clear, uvula midline.     Neck: Supple, without meningismus.     Pulmonary: Clear bilaterally with good chest wall excursion. No rales, rhonchi or wheezing. No accessory muscle use or stridor.    Cardiac: Normal S1, S2 without murmur, rub, gallop or extrasystole. No JVD, Carotids without bruits.    Abdomen: Soft, nontender, active bowel sounds.  No palpable organomegaly.  No rebound or guarding.     Musculoskeletal: Full range of motion. no pain, edema, or deformity. Pulses full and equal. No cyanosis, clubbing, or edema.    Neurological:  Normal sensation, no weakness, no focal findings identified.    Psychiatric: Appropriate mood and affect.       Diagnostics   Labs:  No results found for this or any previous visit (from the past 24 hours).   Radiographs:  No orders to display       Assessment   In brief, Davin Martínez is a 33 y.o. male who presented to the emergency department for a refill of Suboxone.          ED Course/MDM     Diagnoses as of 11/15/24 1218   Opioid use      Visit Vitals  /74 (BP Location: Left arm, Patient Position: Sitting)   Pulse 78   Temp 36.8 °C (98.2 °F) (Oral)   Resp 18   Ht 1.753 m (5' 9\")   Wt 65.8 kg (145 lb)   SpO2 98%   BMI 21.41 kg/m²   Smoking Status Every Day   BSA 1.79 m² "       Medications - No data to display    Patient remained stable while in the emergency department. Previous outpatient and ED records were reviewed. Outside records were reviewed.  OARRS was reviewed.  Patient states that he took his last dose of Suboxone yesterday evening around 6:30 PM.  There is no signs of withdrawal today.  Patient was prescribed a week supply of Suboxone.  Patient states that he does not need any Narcan and has 2 prescriptions at home.  Patient was referred to the MAT clinic. Advised patient to follow up with his PCP and return to the emergency department with worsening symptoms.    Final Impression      Former opioid use disorder    DISPOSITION  Disposition: Discharged home    Comment: Please note this report has been produced using speech recognition software and may contain errors related to that system including errors in grammar, punctuation, and spelling, as well as words and phrases that may be inappropriate.  If there are any questions or concerns please feel free to contact the dictating provider for clarification.    DELON Hooper-DELON [place]-PERRY  11/15/24 6919

## 2024-12-02 ENCOUNTER — HOSPITAL ENCOUNTER (EMERGENCY)
Facility: HOSPITAL | Age: 34
Discharge: HOME | End: 2024-12-02
Attending: EMERGENCY MEDICINE
Payer: COMMERCIAL

## 2024-12-02 ENCOUNTER — PHARMACY VISIT (OUTPATIENT)
Dept: PHARMACY | Facility: CLINIC | Age: 34
End: 2024-12-02
Payer: MEDICARE

## 2024-12-02 VITALS
RESPIRATION RATE: 16 BRPM | DIASTOLIC BLOOD PRESSURE: 78 MMHG | HEIGHT: 69 IN | TEMPERATURE: 96.8 F | WEIGHT: 148 LBS | OXYGEN SATURATION: 100 % | HEART RATE: 83 BPM | BODY MASS INDEX: 21.92 KG/M2 | SYSTOLIC BLOOD PRESSURE: 124 MMHG

## 2024-12-02 DIAGNOSIS — Z76.0 MEDICATION REFILL: Primary | ICD-10-CM

## 2024-12-02 PROCEDURE — RXMED WILLOW AMBULATORY MEDICATION CHARGE

## 2024-12-02 PROCEDURE — 2500000002 HC RX 250 W HCPCS SELF ADMINISTERED DRUGS (ALT 637 FOR MEDICARE OP, ALT 636 FOR OP/ED)

## 2024-12-02 PROCEDURE — 99284 EMERGENCY DEPT VISIT MOD MDM: CPT | Performed by: EMERGENCY MEDICINE

## 2024-12-02 PROCEDURE — 2500000001 HC RX 250 WO HCPCS SELF ADMINISTERED DRUGS (ALT 637 FOR MEDICARE OP): Performed by: EMERGENCY MEDICINE

## 2024-12-02 PROCEDURE — 99283 EMERGENCY DEPT VISIT LOW MDM: CPT | Performed by: EMERGENCY MEDICINE

## 2024-12-02 PROCEDURE — 2500000005 HC RX 250 GENERAL PHARMACY W/O HCPCS

## 2024-12-02 RX ORDER — HYDROXYZINE HYDROCHLORIDE 25 MG/1
25 TABLET, FILM COATED ORAL ONCE
Status: COMPLETED | OUTPATIENT
Start: 2024-12-02 | End: 2024-12-02

## 2024-12-02 RX ORDER — ONDANSETRON 4 MG/1
4 TABLET, ORALLY DISINTEGRATING ORAL ONCE
Status: COMPLETED | OUTPATIENT
Start: 2024-12-02 | End: 2024-12-02

## 2024-12-02 RX ORDER — BUPRENORPHINE HYDROCHLORIDE AND NALOXONE HYDROCHLORIDE DIHYDRATE 8; 2 MG/1; MG/1
1 TABLET SUBLINGUAL ONCE
Status: COMPLETED | OUTPATIENT
Start: 2024-12-02 | End: 2024-12-02

## 2024-12-02 RX ORDER — BUPRENORPHINE AND NALOXONE 12; 3 MG/1; MG/1
1 FILM, SOLUBLE BUCCAL; SUBLINGUAL DAILY
Qty: 30 FILM | Refills: 0 | Status: SHIPPED | OUTPATIENT
Start: 2024-12-02 | End: 2025-01-01

## 2024-12-02 ASSESSMENT — PAIN SCALES - GENERAL: PAINLEVEL_OUTOF10: 7

## 2024-12-02 ASSESSMENT — PAIN - FUNCTIONAL ASSESSMENT: PAIN_FUNCTIONAL_ASSESSMENT: 0-10

## 2024-12-02 NOTE — ED TRIAGE NOTES
Patient has been experiencing body aches x2 days. Patient has also been experiencing chills and cough/ nausea x2 days. Patient has been taking suboxe x1.5 year and ran out Friday. Patient needs new prescription and PCP told patient to come to ED.

## 2024-12-02 NOTE — DISCHARGE INSTRUCTIONS
Please follow-up with your appointment in December.  Return to the emergency department for any new or worsening symptoms.

## 2024-12-02 NOTE — ED PROVIDER NOTES
HPI   Chief Complaint   Patient presents with    Generalized Body Aches    Nausea       The patient is a 33-year-old male with history of opioid use disorder who presents to the emergency department for chief complaint of nausea, irritability, and medication refill.  Patient reports that he has been on Suboxone for approximately 1.5 years and recently ran out of his prescription 2 days ago.  Patient reports that he did call his PCP prescriber and does not have an appointment set up until the end of December and is unable to get the appointment moved up any sooner.  Patient reports that he has been experiencing some mild nausea, irritability, and jitteriness.  Patient does report that PCP advised him to come the emergency department to possibly obtain prescription for his suboxone. Patient denies any trauma, falls, or injuries. No passing out. No headache, dizziness, vision changes, neck pain, back pain, chest pain, shortness of breath, vomiting, abdominal pain, diarrhea, constipation, urinary symptoms, numbness, tingling, fevers, or chills. No sick contacts or recent travels. Patient denies any suicidal or homicidal ideation. He denies any hallucinations. He reports that he feels safe at home.               Patient History   Past Medical History:   Diagnosis Date    Crushing injury of finger 04/12/2011    Open fracture of phalanx or phalanges of hand 04/12/2011    Opioid intoxication delirium (Multi) 10/18/2022    Seizure-like activity (Multi) 10/18/2022     Past Surgical History:   Procedure Laterality Date    CT ANGIO NECK  5/24/2023    CT NECK ANGIO W AND WO IV CONTRAST 5/24/2023 [facility]    CT HEAD ANGIO W AND WO IV CONTRAST  5/24/2023    CT HEAD ANGIO W AND WO IV CONTRAST 5/24/2023 [facility] CT     No family history on file.  Social History     Tobacco Use    Smoking status: Every Day     Types: Cigarettes    Smokeless tobacco: Never   Substance Use Topics    Alcohol use: Never    Drug use: Not Currently     Types:  Marijuana       Physical Exam   ED Triage Vitals   Temperature Heart Rate Respirations BP   12/02/24 1247 12/02/24 1247 12/02/24 1248 12/02/24 1248   36 °C (96.8 °F) 83 16 124/78      Pulse Ox Temp src Heart Rate Source Patient Position   12/02/24 1248 -- 12/02/24 1247 --   100 %  Monitor       BP Location FiO2 (%)     -- --             Physical Exam  Vitals and nursing note reviewed.   Constitutional:       General: He is not in acute distress.     Appearance: Normal appearance. He is obese. He is not toxic-appearing.   HENT:      Head: Normocephalic and atraumatic.      Nose: Nose normal.      Mouth/Throat:      Mouth: Mucous membranes are moist.   Eyes:      General: No scleral icterus.     Extraocular Movements: Extraocular movements intact.      Pupils: Pupils are equal, round, and reactive to light.   Cardiovascular:      Rate and Rhythm: Normal rate and regular rhythm.      Heart sounds: Normal heart sounds. No murmur heard.     No friction rub. No gallop.   Pulmonary:      Effort: Pulmonary effort is normal. No respiratory distress.      Breath sounds: Normal breath sounds. No stridor. No wheezing, rhonchi or rales.   Abdominal:      General: There is no distension.      Palpations: Abdomen is soft.      Tenderness: There is no abdominal tenderness. There is no guarding or rebound.   Musculoskeletal:         General: No swelling or deformity. Normal range of motion.      Cervical back: Normal range of motion and neck supple.   Skin:     General: Skin is warm.      Findings: No rash.   Neurological:      General: No focal deficit present.      Mental Status: He is alert.      Cranial Nerves: No cranial nerve deficit.      Sensory: No sensory deficit.      Motor: No weakness.   Psychiatric:         Mood and Affect: Mood normal.         Behavior: Behavior normal.           ED Course & MDM   Diagnoses as of 12/02/24 1341   Medication refill                 No data recorded     [place] Coma Scale Score: 15  (12/02/24 1248 : Sara Inman RN)                           Medical Decision Making  33-year-old male presents to the emergency department for chief complaint of medication refill. In the Emergency Department, hospital records were reviewed. The patient is afebrile with stable vital signs. The patient is in no respiratory distress, satting well on room air. Attending physician and myself did extensively rereview of patient's OARRS account and past prescription refill history.  Patient does appear to have his last Suboxone prescription on 11/15/2024 for 7 days worth.  Patient reports that he has his PCP appointment scheduled for late December but ran out of his Suboxone 2 days ago.  As patient does not appear to have any other concurrent subscriptions at this time attending physician does feel comfortable with writing patient for refill prescription of Suboxone to get him to his appointment later this month.  Attending physician did not believe that patient would overtly benefit from 7-day prescription as this would likely result in him returning the emergency department for similar complaints in the future since he is unable to get into his doctor's office sooner prior to the end of December. Patient is overall well-appearing at this time and amenable to this plan moving forward. He is neurologically intact with soft, nontender, non-peritonitic abdominal exam. He is tolerating po intake. He was given a dose of Suboxone in the ED along with zofran and atarax for symptomatic treatment. He had improvement of his symptoms and felt comfortable being discharged home. Patient answers questions appropriately for me here in the emergency department. Patient will be given refill script for his suboxone and was instructed to follow-up with his outpatient doctor as scheduled. He was instructed of strict return precautions for which he expressed understanding.  Patient did have opportunity ask questions and had them answered  and had no further complaints at this time and was amenable to plan moving forward for discharge. The patient was discharged home in stable condition. They should feel free to return to the Emergency Department at any time should their condition worsen or should they have any questions or concerns.         Procedure  Procedures     César Mercado PA-C  12/02/24 1341       César Mercado PA-C  12/02/24 1350    This patient was seen by the advanced practice provider.  I have personally performed a substantive portion of the encounter.  I have seen and examined the patient; agree with the workup, evaluation, MDM, management and diagnosis.  The care plan has been discussed.      I personally saw the patient and made/approved the management plan and take responsibility for the patient management.    MD Elzbieta Munoz MD  12/04/24 3721

## 2025-01-29 ENCOUNTER — HOSPITAL ENCOUNTER (EMERGENCY)
Facility: HOSPITAL | Age: 35
Discharge: HOME | End: 2025-01-29
Payer: COMMERCIAL

## 2025-01-29 VITALS
WEIGHT: 150 LBS | BODY MASS INDEX: 22.22 KG/M2 | SYSTOLIC BLOOD PRESSURE: 134 MMHG | TEMPERATURE: 36.6 F | DIASTOLIC BLOOD PRESSURE: 79 MMHG | HEART RATE: 89 BPM | OXYGEN SATURATION: 95 % | HEIGHT: 69 IN | RESPIRATION RATE: 16 BRPM

## 2025-01-29 DIAGNOSIS — Z76.0 ENCOUNTER FOR MEDICATION REFILL: Primary | ICD-10-CM

## 2025-01-29 PROCEDURE — 99283 EMERGENCY DEPT VISIT LOW MDM: CPT | Performed by: NURSE PRACTITIONER

## 2025-01-29 PROCEDURE — 99281 EMR DPT VST MAYX REQ PHY/QHP: CPT

## 2025-01-29 ASSESSMENT — PAIN SCALES - GENERAL: PAINLEVEL_OUTOF10: 0 - NO PAIN

## 2025-01-29 ASSESSMENT — PAIN - FUNCTIONAL ASSESSMENT: PAIN_FUNCTIONAL_ASSESSMENT: 0-10

## 2025-01-29 NOTE — ED PROVIDER NOTES
Emergency Department Encounter  [facility] EMERGENCY MEDICINE    Patient: Davin Martínez  MRN: [id number]  : 1990  Date of Evaluation: 2025  ED Provider: NATALIIA Magallon      Chief Complaint       Chief Complaint   Patient presents with    Generalized Body Aches    Med Refill     [place]       Limitations to History: None   Historian: Patient  Records reviewed: EMR inpatient and outpatient notes, Care Everywhere    Davin Martínez is a 34 y.o. male who presents to the emergency department in need of a refill of Suboxone.  I did pull an OARRS report that shows his last prescription of the medication was on .  This was approximately 2 months ago.  Patient states that he has been taking the medication every other day in order to make it last.  He initially was started on Suboxone by family medicine and felt well.  He states he has not been back to see [facility] as he keeps canceling his appointments however he does not want to go into withdrawal.  Patient states that he did take his last dose of Suboxone today.  However he is looking for a new prescription.  Patient states that he is 2 years clean from fentanyl due to his Suboxone use.  He has no other symptoms and no other concerns today    ROS:     Review of Systems  All other systems have been reviewed and are otherwise acutely negative except as in the [place].    Past History     Past Medical History:   Diagnosis Date    Crushing injury of finger 2011    Open fracture of phalanx or phalanges of hand 2011    Opioid intoxication delirium (Multi) 10/18/2022    Seizure-like activity (Multi) 10/18/2022     Past Surgical History:   Procedure Laterality Date    CT ANGIO NECK  2023    CT NECK ANGIO W AND WO IV CONTRAST 2023 [facility] CT    CT HEAD ANGIO W AND WO IV CONTRAST  2023    CT HEAD ANGIO W AND WO IV CONTRAST 2023 [facility] CT       Medications/Allergies     Previous Medications     BUPRENORPHINE-NALOXONE (SUBOXONE) 12-3 MG PER SUBLINGUAL FILM    Place 1 Film under the tongue once daily.    CLONIDINE (CATAPRES) 0.2 MG TABLET    Take 1/2 tablets (0.1 mg) by mouth 2 times a day for 15 days.    NALOXONE (NARCAN) 4 MG/0.1 ML NASAL SPRAY         No Known Allergies     Physical Exam       ED Triage Vitals [01/29/25 1206]   Temperature Heart Rate Respirations BP   (!) 2.6 °C (36.6 °F) 89 16 134/79      Pulse Ox Temp Source Heart Rate Source Patient Position   95 % Temporal Monitor Sitting      BP Location FiO2 (%)     -- --         Physical Exam    GENERAL:  The patient appears nourished and normally developed. Vital signs as documented.     HEENT:  Head normocephalic, atraumatic, EOMs intact, PERRLA, Mucous membranes moist. Nares patent without copious rhinorrhea.  No lymphadenopathy.    PULMONARY:  Lungs are clear to auscultation, without any respiratory distress. Able to speak full sentences, no accessory muscle use    CARDIAC:   Normal rate. No murmurs, rubs or gallops    MUSCULOSKELETAL:   No tenderness of cervical, thoracic and lumbar spine, no step off or deformity noted,  Able to ambulate, Non edematous, with no obvious deformities    SKIN:   Good color, with no significant rashes on exposed skin      NEURO:  No obvious neurological deficits, normal sensation and strength bilaterally.  Able to follow commands, CN 2-12 grossly intact.    Assessment   In brief, Davin Martínez is a 34 y.o. male who presented to the emergency department for a refill of his Suboxone.  Patient picked up his last prescription on December 2 this was a prescription from the emergency department.  He has been taking the medication every other day and took his last dose of the medicine today.  He is not having any acute withdrawal symptoms today.  Today he has the day off work and wanted to get a new prescription.  I will set him up with an appointment with [facility] so that he is able to  his  "prescription of Suboxone.  As he is not in any acute withdrawal he does not need a dose of the medication in the emergency department he did take the dose today    I personally called to talk to the nurse of [facility].  She states that he has not been there in over a year and has not kept to his contract regarding Suboxone.  She has instructed me not to refill the Suboxone clinic here in the emergency department as he has not kept to the clinic.  She states that no one there has told him to come to the emergency department to refill his Suboxone.  She will talk to his physician and call him today with an appointment so that they weigh refill his medication.  I have discussed this with the patient and he is upset that he is not getting a prescription of the medication today from the emergency department and has to jump through all of these hoops to get his medication.      ED Course     Diagnoses as of 01/29/25 1251   Encounter for medication refill       Visit Vitals  /79 (Patient Position: Sitting)   Pulse 89   Temp (!) 2.6 °C (36.6 °F) (Temporal)   Resp 16   Ht 1.753 m (5' 9\")   Wt 68 kg (150 lb)   SpO2 95%   BMI 22.15 kg/m²   Smoking Status Every Day   BSA 1.82 m²       Plan of care discussed, patient verbalizes understanding of plan of care and is in agreement.      Final Impression      1. Encounter for medication refill          DISPOSITION  Disposition: Discharge  Patient condition is: Stable    Comment: Please note this report has been produced using speech recognition software and may contain errors related to that system including errors in grammar, punctuation, and spelling, as well as words and phrases that may be inappropriate.  If there are any questions or concerns please feel free to contact the dictating provider for clarification.    NATALIIA Magallon APRN-CNP  01/29/25 1252       NATALIIA Magallon  01/29/25 1310    "

## 2025-01-30 ENCOUNTER — PHARMACY VISIT (OUTPATIENT)
Dept: PHARMACY | Facility: CLINIC | Age: 35
End: 2025-01-30
Payer: MEDICARE

## 2025-01-30 ENCOUNTER — TELEPHONE (OUTPATIENT)
Facility: HOSPITAL | Age: 35
End: 2025-01-30

## 2025-01-30 ENCOUNTER — HOSPITAL ENCOUNTER (EMERGENCY)
Facility: HOSPITAL | Age: 35
Discharge: LEFT BEFORE DISCHARGE | End: 2025-01-30
Payer: COMMERCIAL

## 2025-01-30 ENCOUNTER — OFFICE VISIT (OUTPATIENT)
Facility: HOSPITAL | Age: 35
End: 2025-01-30
Payer: COMMERCIAL

## 2025-01-30 VITALS
TEMPERATURE: 97.7 F | DIASTOLIC BLOOD PRESSURE: 77 MMHG | RESPIRATION RATE: 16 BRPM | HEIGHT: 69 IN | BODY MASS INDEX: 22.22 KG/M2 | HEART RATE: 77 BPM | SYSTOLIC BLOOD PRESSURE: 133 MMHG | OXYGEN SATURATION: 100 % | WEIGHT: 150 LBS

## 2025-01-30 VITALS
HEART RATE: 82 BPM | OXYGEN SATURATION: 100 % | SYSTOLIC BLOOD PRESSURE: 116 MMHG | TEMPERATURE: 98.6 F | HEIGHT: 69 IN | WEIGHT: 138.2 LBS | BODY MASS INDEX: 20.47 KG/M2 | DIASTOLIC BLOOD PRESSURE: 73 MMHG

## 2025-01-30 DIAGNOSIS — Z76.0 MEDICATION REFILL: Primary | ICD-10-CM

## 2025-01-30 DIAGNOSIS — F11.90 OPIOID USE DISORDER: Primary | ICD-10-CM

## 2025-01-30 DIAGNOSIS — Z76.0 MEDICATION REFILL: ICD-10-CM

## 2025-01-30 PROCEDURE — 99214 OFFICE O/P EST MOD 30 MIN: CPT | Performed by: FAMILY MEDICINE

## 2025-01-30 PROCEDURE — 99281 EMR DPT VST MAYX REQ PHY/QHP: CPT

## 2025-01-30 PROCEDURE — 3008F BODY MASS INDEX DOCD: CPT | Performed by: FAMILY MEDICINE

## 2025-01-30 PROCEDURE — 99284 EMERGENCY DEPT VISIT MOD MDM: CPT | Performed by: NURSE PRACTITIONER

## 2025-01-30 PROCEDURE — RXMED WILLOW AMBULATORY MEDICATION CHARGE

## 2025-01-30 RX ORDER — BUPRENORPHINE AND NALOXONE 12; 3 MG/1; MG/1
1 FILM, SOLUBLE BUCCAL; SUBLINGUAL DAILY
Qty: 30 FILM | Refills: 0 | Status: SHIPPED | OUTPATIENT
Start: 2025-01-30 | End: 2025-03-01

## 2025-01-30 ASSESSMENT — ENCOUNTER SYMPTOMS
NERVOUS/ANXIOUS: 0
FATIGUE: 0
DEPRESSION: 1
FEVER: 0
CONFUSION: 0
DYSPHORIC MOOD: 0
ABDOMINAL PAIN: 0
RHINORRHEA: 0
DIARRHEA: 0
CHILLS: 1
CONSTIPATION: 0
MYALGIAS: 1
EYE DISCHARGE: 1
DIAPHORESIS: 1

## 2025-01-30 ASSESSMENT — PATIENT HEALTH QUESTIONNAIRE - PHQ9: 1. LITTLE INTEREST OR PLEASURE IN DOING THINGS: NOT AT ALL

## 2025-01-30 ASSESSMENT — PAIN SCALES - GENERAL: PAINLEVEL_OUTOF10: 8

## 2025-01-30 NOTE — TELEPHONE ENCOUNTER
Patient came into the office today and was very rude and disrespectful and Dr Cruz asked me to schedule an appointment for the patient to be seen for his medication since he haven't been seen in over a year so he needed to schedule an appointment for his medication. I called the patient back to confirm that he could be seen by Dr Cruz and he called me a stupid as  and hung the phone up on me. I did inform Anthony of how the patient was acting and his previous encounter with the ED and they called up to the office to inform us about the patient aggressive behavior.

## 2025-01-30 NOTE — ED TRIAGE NOTES
Pt was here yesterday for body aches. States today they are worse. Pt is out of soboxone. Per notes from yesterday, pt has not followed his program in over a year and has been told not to come to ED for his refill and was not given it yesterday.

## 2025-01-30 NOTE — PATIENT INSTRUCTIONS
Thanks for coming to the [facility] today.   The issues we addressed were:    Re-establish buprenorphine program       Thank you for coming to [facility] today.  To continue in the buprenorphine program, use your medication as prescribed, and keep in compliance with your treatment agreement. We will review this every year and whenever necessary.     Please schedule a Health Maintenance appointment once a year with me, or another primary care physician , and obtain follow up for any other chronic illnesses.     Buprenorphine program reminders:     Keep your medication hidden from sight in a secure place,, out of reach of all others, especially children and pets. Protect it from theft since it cannot be replaced.   Accidental or deliberate use by a child,  teenager, or adult without tolerance can cause respiratory depression that can result in death.   If a child is exposed to one of these products, medical attention should be sought immediately.  USE NARCAN NOSE SPRAY , ONE SPRAY< REPEAT AFTER ONE MINUTE AND CALL 911  for any person you suspect has used buprenorphine or another opioid and who is unable to wake up.    Do not use alcohol or benzodiazepines that are not prescribed to you with buprenorphine. Use all medications only as directed by the prescriber.   Do not sell or give your medication to any other person, even if they have symptoms like yours. Harm or death may occur.   Read the Medication Guide provided with your medication, and call [facility] at [phone] with any concerns you have .     Keep a record of your recovery activity, including 12 step meetings, counselling, Christian and spiritual activities, and any others that help you to be in recovery.  You are strongly encouraged to seek this support for safe and effective treatment.     Return as advised, and stay safe.     Lise Cruz MD      Please see me or one of the doctors on Team A in 4 weeks   At that time, we will follow up  on   If you are sick at any time and need to be seen, contact us for advice.  Please use [employer] when you can- it works.

## 2025-01-30 NOTE — PROGRESS NOTES
"Subjective   Patient ID: Davin Martínez is a 34 y.o. male who presents for Follow-up.    [place] [facility] [place]ive   Davin Martínez is a 34 y.o. male who presents for routine Buprenorphine follow-up assessment.  Resuming care here after an absence- saw Dr. Head and has had a variety of sequential bup/naloxone prescribers. Had a period of abstinence and would like to return.  Last use of illicit opioid is >1 y ago, but ran out of bup/naloxine and has WD and craving      Infrequent use of alcohol; \"recreational\" marijuana use  Social: , children, employed      OARRS:  Lise Cruz MD on 1/30/2025  1:55 PM  I have personally reviewed the OARRS report for Davin Martínez. I have considered the risks of abuse, dependence, addiction and diversion    Urine Drug Screening  No recent test in system     Withdrawal Symptoms: anxiety, restlessness, and sweats  COWS 8-10  Buprenorphine Side Effects: none    Recovery Activities:  Works with safe co-workers   Enjoys family activities  Has attended  and is receptive to returning     Agreement for Buprenorphine/(Naloxone) for the treatment of Opioid use  Reviewed and signed new Controlled Substance Agreement including but not limited to the benefits, risks, and alternatives to treatment with a Controlled Substance medication(s).   Date of the Last Agreement: 1-30-25    Reviewed appropriate behavior in clinic- staff reported that he addressed them with profanity.  He admits to being frustrated and irritable because of the withdrawal, and states he can avoid uncivil behavior in the future.     Nicotine Use  smoker, contemplative   He will request nicotine replacement when ready.         Review of Systems   Constitutional:  Positive for chills and diaphoresis. Negative for fatigue and fever.   HENT:  Negative for congestion, rhinorrhea and sneezing.    Eyes:  Positive for discharge.   Cardiovascular:  Negative for chest pain. " "  Gastrointestinal:  Negative for abdominal pain, constipation and diarrhea.   Musculoskeletal:  Positive for myalgias.   Psychiatric/Behavioral:  Positive for behavioral problems. Negative for confusion and dysphoric mood. The patient is not nervous/anxious.        Objective   /73 (BP Location: Right arm, Patient Position: Sitting)   Pulse 82   Temp 37 °C (98.6 °F) (Temporal)   Ht 1.753 m (5' 9\")   Wt 62.7 kg (138 lb 3.2 oz)   SpO2 100%   BMI 20.41 kg/m²     Physical exam:  Well groomed, comfortable at rest.   BP: 116/73 reports stable weight at BMI 20.4  Eyes: JIMMY, normal pupils  ENT: Normal nasal  mucosa  CV: Normal heart sounds, RRR  Lungs clear, no wheeze or crackles  Neuro: Tone, power symmetric, DTR 2+, no tremor  MSK: No joint tenderness or swelling  Integuement: No diaphoresis, no rash,   Extremities: no leg edema      Assessment/Plan     Opioid use disorder/Buprenorphine Program:  Patient is a candidate for buprenorphine program to  reduce risks of overdose death   Urine drug screens:sent today   OARRS: Results are as expected.   Recovery Activities: As above- encouraged re-connecting with a meeting   Challenges: Employment   Labs and OOBEPC ordered. Patient is aware of precipitated withdrawal. Last opioid was bup/naloxone >2 days ago and he is in mild but increasing WD so safe to re-start.   -     Opiate/Opioid/Benzo Prescription Compliance;  -     Buprenorphine Confirm,Urine;   -     HIV 1/2 Antigen/Antibody Screen with Reflex to Confirmation;   -     Hepatitis C antibody;   -     Hepatitis B surface antigen;   -     Hepatitis B surface antibody;   -     Hepatitis B core antibody, total;   -     Comprehensive metabolic panel;   -     Syphilis Screen with Reflex;  -     Hepatitis A Antibody, IgM;   Medication :   -     buprenorphine-naloxone (Suboxone) 12-3 mg per sublingual film; Place 1 Film under the tongue once daily.      Risks for abuse of Buprenorphine/Naloxone and safety requirements  " (hidden, locked area, out of reach of children and pets; Narcan prescription) are revisited yearly with CSA and as needed- no current concerns.CSA completed and signed today.     Benefits of treatment for relapse prevention outweigh risks associated with Buprenorphine.    Plan/Revision to Plan:  Continue current dose of Buprenorphine/Naloxone:   12/3 mg films, 2 daily .   Offered tabs for dental protection  but will delay till stable  OARRS at every appointment and as needed.  Drug screen random, quarterly and at other times as needed.  Meetings encouraged when safe   Counselling available as needed.  I advised him to apologize for rude language to staff involved and he is aware that further uncivil behavior would result in discharge from the practice and referral to a bup/naloxone program elsewhere.   If he cannot continue in the practice, we will notify him and provide referral       Follow-up 4 weeks  Buprenorphine Program   Plan YAP when stable

## 2025-01-30 NOTE — ED PROVIDER NOTES
Emergency Department Encounter  [facility] EMERGENCY MEDICINE    Patient: Davin Martínez  MRN: [id number]  : 1990  Date of Evaluation: 2025  ED Provider: NATALIIA Magallon      Chief Complaint       Chief Complaint   Patient presents with    Generalized Body Aches     [place]       Limitations to History: None   Historian: Patient  Records reviewed: EMR inpatient and outpatient notes, Care Everywhere    Davin Martínez is a 34 y.o. male who presents to the emergency department complaining of bodyaches.  Patient states that he is in need of a Suboxone refill.  He denies any flulike illness and does not want to be tested for COVID or influenza.  Patient was seen by me in the emergency department yesterday with concerns of his Suboxone refill.  Patient last received his Suboxone from the emergency department on  over 2 months ago.  Yesterday,  I called his family practice office who states that they would make an appointment for him.  A few minutes later I did talk to the attending physician Dr. Cruz who made the time in her schedule to see him that day.  Her plan was to reestablish care and reinstitution a suboxone care plan as he has not been seen there in a year.  Apparently he has been getting all of his Suboxone from the emergency department for the last year. however the patient states that he did not go to that visit.  he is here in the emergency department today and is demanding a prescription of Suboxone.  Patient is getting loud and yelling in the emergency department.  He has stormed out of the emergency department during our conversation, punching the door on his way out to go up to family practice.  I have called family practice to try to schedule an appointment again for him today.  I have talked to the nurse and the nurse manager in the family practice office.  They have assured me that Dr. Cruz will be coming in this afternoon and that she does have an  appointment they will get him scheduled in that appointment for this afternoon to reestablish care and reestablish the Suboxone care plan    ROS:     Review of Systems  All other systems have been reviewed and are otherwise acutely negative except as in the [place].    Past History     Past Medical History:   Diagnosis Date    Crushing injury of finger 04/12/2011    Open fracture of phalanx or phalanges of hand 04/12/2011    Opioid intoxication delirium (Multi) 10/18/2022    Seizure-like activity (Multi) 10/18/2022     Past Surgical History:   Procedure Laterality Date    CT ANGIO NECK  5/24/2023    CT NECK ANGIO W AND WO IV CONTRAST 5/24/2023 [facility] CT    CT HEAD ANGIO W AND WO IV CONTRAST  5/24/2023    CT HEAD ANGIO W AND WO IV CONTRAST 5/24/2023 [facility] CT       Medications/Allergies     Discharge Medication List as of 1/30/2025  9:14 AM        CONTINUE these medications which have NOT CHANGED    Details   buprenorphine-naloxone (Suboxone) 12-3 mg per sublingual film Place 1 Film under the tongue once daily., Starting Mon 12/2/2024, Until Wed 1/1/2025, Normal      cloNIDine (Catapres) 0.2 mg tablet Take 1/2 tablets (0.1 mg) by mouth 2 times a day for 15 days., Starting Mon 11/27/2023, Until Mon 12/18/2023, Normal      naloxone (Narcan) 4 mg/0.1 mL nasal spray Historical Med           No Known Allergies     Physical Exam       ED Triage Vitals [01/30/25 0855]   Temperature Heart Rate Respirations BP   36.5 °C (97.7 °F) 77 16 133/77      Pulse Ox Temp Source Heart Rate Source Patient Position   100 % Temporal Monitor Sitting      BP Location FiO2 (%)     Left arm --         Physical Exam    GENERAL:  The patient appears nourished and normally developed. Vital signs as documented.     HEENT:  Head normocephalic, atraumatic, EOMs intact, PERRLA, Mucous membranes moist. Nares patent without copious rhinorrhea.    PULMONARY:  without any respiratory distress. Able to speak full sentences, no accessory muscle  "use    MUSCULOSKELETAL:   No tenderness of cervical, thoracic and lumbar spine, no step off or deformity noted,  Able to ambulate, Non edematous, with no obvious deformities    SKIN:   Good color, with no significant rashes on exposed skin      NEURO:  No obvious neurological deficits, normal sensation and strength bilaterally.  Able to follow commands, CN 2-12 grossly intact.      ED Course     Diagnoses as of 01/30/25 0925   Medication refill     Patient has left the emergency department without completing treatment.  He declined viral testing and states he only wants a Suboxone prescription.  After he left I was able to talk to his family physician's office who states that they will reestablish care with the patient and reinstitute a Suboxone program for the patient as his physician Dr. Cruz has an appointment available this afternoon.  As the patient is up in their office currently they have informed the patient of the available appointment this afternoon      Visit Vitals  /77 (BP Location: Left arm, Patient Position: Sitting)   Pulse 77   Temp 36.5 °C (97.7 °F) (Temporal)   Resp 16   Ht 1.753 m (5' 9\")   Wt 68 kg (150 lb)   SpO2 100%   BMI 22.15 kg/m²   Smoking Status Every Day   BSA 1.82 m²       Plan of care discussed, patient verbalizes understanding of plan of care and is in agreement.      Final Impression      1. Medication refill            Comment: Please note this report has been produced using speech recognition software and may contain errors related to that system including errors in grammar, punctuation, and spelling, as well as words and phrases that may be inappropriate.  If there are any questions or concerns please feel free to contact the dictating provider for clarification.    NATALIIA Magallon APRN-CNP  01/30/25 0925    "

## 2025-02-02 LAB
1OH-MIDAZOLAM UR-MCNC: NEGATIVE NG/ML
7AMINOCLONAZEPAM UR-MCNC: NEGATIVE NG/ML
A-OH ALPRAZ UR-MCNC: NEGATIVE NG/ML
A-OH-TRIAZOLAM UR-MCNC: NEGATIVE NG/ML
AMPHETAMINES UR QL: NEGATIVE NG/ML
BARBITURATES UR QL: NEGATIVE NG/ML
BUPRENORPHINE UR-MCNC: 226 NG/ML
BZE UR QL: NEGATIVE NG/ML
CODEINE UR-MCNC: NEGATIVE NG/ML
CREAT UR-MCNC: 273.5 MG/DL
DRUG SCREEN COMMENT UR-IMP: ABNORMAL
EDDP UR-MCNC: NEGATIVE NG/ML
FENTANYL UR-MCNC: NEGATIVE NG/ML
HYDROCODONE UR-MCNC: NEGATIVE NG/ML
HYDROMORPHONE UR-MCNC: NEGATIVE NG/ML
LORAZEPAM UR-MCNC: NEGATIVE NG/ML
METHADONE UR-MCNC: NEGATIVE NG/ML
MORPHINE UR-MCNC: NEGATIVE NG/ML
NORBUPRENORPHINE UR-MCNC: 406 NG/ML
NORDIAZEPAM UR-MCNC: NEGATIVE NG/ML
NORFENTANYL UR-MCNC: NEGATIVE NG/ML
NORHYDROCODONE UR CFM-MCNC: NEGATIVE NG/ML
NOROXYCODONE UR CFM-MCNC: NEGATIVE NG/ML
NORTRAMADOL UR-MCNC: NEGATIVE NG/ML
OH-ETHYLFLURAZ UR-MCNC: NEGATIVE NG/ML
OXAZEPAM UR-MCNC: NEGATIVE NG/ML
OXIDANTS UR QL: NEGATIVE MCG/ML
OXYCODONE UR CFM-MCNC: NEGATIVE NG/ML
OXYMORPHONE UR CFM-MCNC: NEGATIVE NG/ML
PCP UR QL: NEGATIVE NG/ML
PH UR: 5.3 [PH] (ref 4.5–9)
QUEST 6 ACETYLMORPHINE: NEGATIVE NG/ML
QUEST NALOXONE, URINE: 471 NG/ML
QUEST NOTES AND COMMENTS: ABNORMAL
QUEST ZOLPIDEM: NEGATIVE NG/ML
TEMAZEPAM UR-MCNC: NEGATIVE NG/ML
THC UR QL: POSITIVE NG/ML
THC UR-MCNC: >5000 NG/ML
TRAMADOL UR-MCNC: NEGATIVE NG/ML
ZOLPIDEM PHENYL-4-CARB UR CFM-MCNC: NEGATIVE NG/ML

## 2025-02-24 DIAGNOSIS — Z76.0 MEDICATION REFILL: ICD-10-CM

## 2025-02-27 PROCEDURE — RXMED WILLOW AMBULATORY MEDICATION CHARGE

## 2025-02-27 RX ORDER — BUPRENORPHINE AND NALOXONE 12; 3 MG/1; MG/1
1 FILM, SOLUBLE BUCCAL; SUBLINGUAL DAILY
Qty: 30 FILM | Refills: 0 | Status: SHIPPED | OUTPATIENT
Start: 2025-02-28 | End: 2025-03-30
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
## 2025-03-04 ENCOUNTER — PHARMACY VISIT (OUTPATIENT)
Dept: PHARMACY | Facility: CLINIC | Age: 35
End: 2025-03-04
Payer: MEDICARE

## 2025-04-16 ENCOUNTER — APPOINTMENT (OUTPATIENT)
Facility: HOSPITAL | Age: 35
End: 2025-04-16
Payer: MEDICAID

## 2025-04-22 ENCOUNTER — OFFICE VISIT (OUTPATIENT)
Facility: HOSPITAL | Age: 35
End: 2025-04-22
Payer: MEDICAID

## 2025-04-22 VITALS
DIASTOLIC BLOOD PRESSURE: 71 MMHG | SYSTOLIC BLOOD PRESSURE: 123 MMHG | WEIGHT: 137.4 LBS | OXYGEN SATURATION: 96 % | BODY MASS INDEX: 20.35 KG/M2 | HEART RATE: 80 BPM | TEMPERATURE: 97.6 F | HEIGHT: 69 IN

## 2025-04-22 DIAGNOSIS — Z00.00 HEALTHCARE MAINTENANCE: Primary | ICD-10-CM

## 2025-04-22 DIAGNOSIS — F11.90 OPIOID USE DISORDER: ICD-10-CM

## 2025-04-22 DIAGNOSIS — Z76.0 MEDICATION REFILL: ICD-10-CM

## 2025-04-22 PROCEDURE — 99214 OFFICE O/P EST MOD 30 MIN: CPT | Performed by: FAMILY MEDICINE

## 2025-04-22 PROCEDURE — RXMED WILLOW AMBULATORY MEDICATION CHARGE

## 2025-04-22 PROCEDURE — 3008F BODY MASS INDEX DOCD: CPT | Performed by: FAMILY MEDICINE

## 2025-04-22 RX ORDER — BUPRENORPHINE AND NALOXONE 12; 3 MG/1; MG/1
1 FILM, SOLUBLE BUCCAL; SUBLINGUAL DAILY
Qty: 30 FILM | Refills: 1 | Status: SHIPPED | OUTPATIENT
Start: 2025-04-22 | End: 2025-06-21

## 2025-04-22 ASSESSMENT — ENCOUNTER SYMPTOMS
ARTHRALGIAS: 0
RHINORRHEA: 0
FACIAL ASYMMETRY: 0
COUGH: 0
LOSS OF SENSATION IN FEET: 0
ACTIVITY CHANGE: 1
DYSPHORIC MOOD: 0
DEPRESSION: 0
EYE DISCHARGE: 0
CONSTIPATION: 0
OCCASIONAL FEELINGS OF UNSTEADINESS: 0
NERVOUS/ANXIOUS: 0

## 2025-04-22 ASSESSMENT — PATIENT HEALTH QUESTIONNAIRE - PHQ9
2. FEELING DOWN, DEPRESSED OR HOPELESS: NOT AT ALL
SUM OF ALL RESPONSES TO PHQ9 QUESTIONS 1 AND 2: 0
1. LITTLE INTEREST OR PLEASURE IN DOING THINGS: NOT AT ALL

## 2025-04-22 ASSESSMENT — PAIN SCALES - GENERAL: PAINLEVEL_OUTOF10: 0-NO PAIN

## 2025-04-22 NOTE — PROGRESS NOTES
"Subjective   Patient ID: Davin Martínez is a 34 y.o. male who presents for Follow-up and Med Refill.    HPI:  Unable to get a timely appointment so he has a\"stretched\" last presciption, but would prefer to return to 1 film daily  2.  3.  Schedule yearly adult physical next appointment    Medications reviewed and reconciled    OARRS:  I have personally reviewed the OARRS report for Davin Martínez. I have considered the risks of abuse, dependence, addiction and diversion, Last entry 2/27/25 - subsequently     Urine Drug Screening  Last Urine Drug Screen: 1/30/25    Recent Results (from the past 8760 hours)   Buprenorphine Confirm,Urine    Collection Time: 01/30/25  2:09 PM   Result Value Ref Range    Buprenorphine 226 (H) <2 ng/mL    Norbuprenorphine 406 (H) <2 ng/mL    Naloxone 471 (H) <2 ng/mL    Buprenorphine Comments      Notes and Comments     Opiate/Opioid/Benzo Prescription Compliance    Collection Time: 01/30/25  2:09 PM   Result Value Ref Range    Creatinine 273.5 > or = 20.0 mg/dL    pH 5.3 4.5 - 9.0    Oxidant NEGATIVE <200 mcg/mL    Amphetamines NEGATIVE <500 ng/mL    Barbiturates NEGATIVE <300 ng/mL    Cocaine Metabolite NEGATIVE <150 ng/mL    Marijuana Metabolite POSITIVE (A) <20 ng/mL    Marijuana Metabolite >5000 (H) <5 ng/mL    Marijuana Comments      Phencyclidine NEGATIVE <25 ng/mL    Alphahydroxyalprazolam NEGATIVE <25 ng/mL    Alphahydroxymidazolam NEGATIVE <50 ng/mL    Alphahydroxytriazolam NEGATIVE <50 ng/mL    Aminoclonazepam NEGATIVE <25 ng/mL    Hydroxyethylflurazepam NEGATIVE <50 ng/mL    Lorazepam NEGATIVE <50 ng/mL    Nordiazepam NEGATIVE <50 ng/mL    Oxazepam NEGATIVE <50 ng/mL    Temazepam NEGATIVE <50 ng/mL    Benzodiazepines Comments      Fentanyl NEGATIVE <0.5 ng/mL    Norfentanyl NEGATIVE <0.5 ng/mL    Fentanyl Comments      6 Acetylmorphine NEGATIVE <10 ng/mL    Heroin Metab Comments      EDDP NEGATIVE <100 ng/mL    Methadone NEGATIVE <100 ng/mL    Methadone Comments      Codeine " NEGATIVE <50 ng/mL    Hydrocodone NEGATIVE <50 ng/mL    Hydromorphone NEGATIVE <50 ng/mL    Morphine NEGATIVE <50 ng/mL    Norhydrocodone NEGATIVE <50 ng/mL    Opiates Comments      Noroxycodone NEGATIVE <50 ng/mL    Oxycodone NEGATIVE <50 ng/mL    Oxymorphone NEGATIVE <50 ng/mL    Oxycodone Comments      Desmethyltramadol NEGATIVE <100 ng/mL    Tramadol NEGATIVE <100 ng/mL    Tramadol Comments      Zolpidem NEGATIVE <5 ng/mL    Zolpidem Metabolite NEGATIVE <5 ng/mL    Zolpidem Comments       Results are as expected.     Withdrawal Symptoms: none  Buprenorphine Side Effects: none    Covering activities include [employer] attendance and participation in groups.  He would like to engage in more activity focused on JAIME, and plans to walk over to centers to check on anything he can do there.   No meetings now that he has attended in the past  There is a meeting in his [employer] but he prefer better boundaries looking elsewhere (centers)    Agreement for Buprenorphine/(Naloxone) for the treatment of Opioid use  Reviewed Controlled Substance Agreement including but not limited to the benefits, risks, and alternatives to treatment with a Controlled Substance medication(s).   Date of the Last Agreement: 1/30/25    Nicotine Use  smoker, not ready   Aware of benefits of smoking cessation  Encouraged to let me know when he needs help        Review of Systems   Constitutional:  Positive for activity change.   HENT:  Negative for congestion and rhinorrhea.    Eyes:  Negative for discharge.   Respiratory:  Negative for cough.    Cardiovascular:  Negative for chest pain and leg swelling.   Gastrointestinal:  Negative for constipation.   Musculoskeletal:  Negative for arthralgias.   Neurological:  Negative for facial asymmetry.   Psychiatric/Behavioral:  Negative for dysphoric mood. The patient is not nervous/anxious.        Objective   /71 (BP Location: Right arm, Patient Position: Sitting, BP Cuff Size: Adult)   Pulse 80   Temp  "36.4 °C (97.6 °F) (Temporal)   Ht 1.753 m (5' 9\")   Wt 62.3 kg (137 lb 6.4 oz)   SpO2 96%   BMI 20.29 kg/m²     Physical exam:  Seen with son Sanchez in room- parenting toddler appropriately   Well groomed, comfortable at rest.   BP: 123/71  Eyes: JIMMY, normal pupils  ENT: Normal nasal  mucosa  CV: Normal heart sounds, RRR  Lungs clear, no wheeze or crackles  Neuro: Tone, power symmetric, no tremor  MSK: No joint tenderness or swelling  Integuement: No diaphoresis, no rash,   Extremities: no leg edema      Assessment/Plan       Buprenorphine Program:  Meeting requirements to continue program, for abstinence based OUD recovery goals.  Conties use of cannabis with harm reduction precautions   Urine drug screens: Results are as expected.   OARRS: Results are as expected.   Recovery Activities: adequate, expanding   Challenges: parenting, remaining employed     Risks for abuse of Buprenorphine/Naloxone and safety requirements  (hidden, locked area, out of reach of children and pets; Narcan prescription) are revisited yearly with CSA and as needed- no current concerns.    Benefits of treatment for relapse prevention outweigh risks associated with Buprenorphine.    Plan/Revision to Plan:  Continue current dose of Buprenorphine/Naloxone:  8  mg daily  -     buprenorphine-naloxone (Suboxone) 12-3 mg per sublingual film; Place 1 Film under the tongue once daily.  OARRS at every appointment and as needed.  Drug screen random, quarterly and at other times as needed.  Opiate/Opioid/Benzo Prescription Compliance; Future  -     Buprenorphine Confirm,Urine; Future  Meetings encouraged when safe and accessible.  Counselling available as needed.    Healthcare maintenance  -     Follow Up In Primary Care - Medicare Annual; 8 weeks                                          "

## 2025-04-22 NOTE — PATIENT INSTRUCTIONS
Thanks for coming to the [facility] today.   The issues we addressed were:  Buprenorphine program     Thank you for coming to [facility] today.  To continue in the buprenorphine program, use your medication as prescribed, and keep in compliance with your treatment agreement. We will review this every year and whenever necessary.     Please schedule a Health Maintenance appointment once a year with me, or another primary care physician , and obtain follow up for any other chronic illnesses.     Buprenorphine program reminders:     Keep your medication hidden from sight in a secure place,, out of reach of all others, especially children and pets. Protect it from theft since it cannot be replaced.   Accidental or deliberate use by a child,  teenager, or adult without tolerance can cause respiratory depression that can result in death.   If a child is exposed to one of these products, medical attention should be sought immediately.  USE NARCAN NOSE SPRAY , ONE SPRAY< REPEAT AFTER ONE MINUTE AND CALL 911  for any person you suspect has used buprenorphine or another opioid and who is unable to wake up.    Do not use alcohol or benzodiazepines that are not prescribed to you with buprenorphine. Use all medications only as directed by the prescriber.   Do not sell or give your medication to any other person, even if they have symptoms like yours. Harm or death may occur.   Read the Medication Guide provided with your medication, and call Family Medicine at [phone] with any concerns you have .     Keep a record of your recovery activity, including 12 step meetings, counselling, [employer] and spiritual activities, and any others that help you to be in recovery.  You are strongly encouraged to seek this support for safe and effective treatment.   Next visit weill be your yearly physical, and tetanus shot update   Return as advised, and stay safe.     Lise Cruz MD      Please see me or one of the doctors on Team  A in   At that time, we will follow up on   If you are sick at any time and need to be seen, contact us for advice.  Please use [employer] when you can- it works.

## 2025-04-24 ENCOUNTER — PHARMACY VISIT (OUTPATIENT)
Dept: PHARMACY | Facility: CLINIC | Age: 35
End: 2025-04-24
Payer: MEDICARE

## 2025-04-24 LAB
1OH-MIDAZOLAM UR-MCNC: NEGATIVE NG/ML
7AMINOCLONAZEPAM UR-MCNC: NEGATIVE NG/ML
A-OH ALPRAZ UR-MCNC: NEGATIVE NG/ML
A-OH-TRIAZOLAM UR-MCNC: NEGATIVE NG/ML
AMPHETAMINES UR QL: NEGATIVE NG/ML
BARBITURATES UR QL: NEGATIVE NG/ML
BUPRENORPHINE UR-MCNC: 262 NG/ML
BZE UR QL: NEGATIVE NG/ML
CODEINE UR-MCNC: NEGATIVE NG/ML
CREAT UR-MCNC: 333.5 MG/DL
DRUG SCREEN COMMENT UR-IMP: ABNORMAL
EDDP UR-MCNC: NEGATIVE NG/ML
FENTANYL UR-MCNC: NEGATIVE NG/ML
HYDROCODONE UR-MCNC: NEGATIVE NG/ML
HYDROMORPHONE UR-MCNC: NEGATIVE NG/ML
LORAZEPAM UR-MCNC: NEGATIVE NG/ML
METHADONE UR-MCNC: NEGATIVE NG/ML
MORPHINE UR-MCNC: NEGATIVE NG/ML
NORBUPRENORPHINE UR-MCNC: 359 NG/ML
NORDIAZEPAM UR-MCNC: NEGATIVE NG/ML
NORFENTANYL UR-MCNC: NEGATIVE NG/ML
NORHYDROCODONE UR CFM-MCNC: NEGATIVE NG/ML
NOROXYCODONE UR CFM-MCNC: NEGATIVE NG/ML
NORTRAMADOL UR-MCNC: NEGATIVE NG/ML
OH-ETHYLFLURAZ UR-MCNC: NEGATIVE NG/ML
OXAZEPAM UR-MCNC: NEGATIVE NG/ML
OXIDANTS UR QL: NEGATIVE MCG/ML
OXYCODONE UR CFM-MCNC: NEGATIVE NG/ML
OXYMORPHONE UR CFM-MCNC: NEGATIVE NG/ML
PCP UR QL: NEGATIVE NG/ML
PH UR: 5 [PH] (ref 4.5–9)
QUEST 6 ACETYLMORPHINE: ABNORMAL
QUEST NALOXONE, URINE: 424 NG/ML
QUEST NOTES AND COMMENTS: ABNORMAL
QUEST PATIENT HISTORICAL REPORT: ABNORMAL
QUEST ZOLPIDEM: NEGATIVE NG/ML
TEMAZEPAM UR-MCNC: NEGATIVE NG/ML
THC UR QL: POSITIVE NG/ML
THC UR-MCNC: 2268 NG/ML
TRAMADOL UR-MCNC: NEGATIVE NG/ML
ZOLPIDEM PHENYL-4-CARB UR CFM-MCNC: NEGATIVE NG/ML

## 2025-04-28 LAB
1OH-MIDAZOLAM UR-MCNC: NEGATIVE NG/ML
7AMINOCLONAZEPAM UR-MCNC: NEGATIVE NG/ML
A-OH ALPRAZ UR-MCNC: NEGATIVE NG/ML
A-OH-TRIAZOLAM UR-MCNC: NEGATIVE NG/ML
AMPHETAMINES UR QL: NEGATIVE NG/ML
BARBITURATES UR QL: NEGATIVE NG/ML
BUPRENORPHINE UR-MCNC: 262 NG/ML
BZE UR QL: NEGATIVE NG/ML
CODEINE UR-MCNC: NEGATIVE NG/ML
CREAT UR-MCNC: 333.5 MG/DL
DRUG SCREEN COMMENT UR-IMP: ABNORMAL
EDDP UR-MCNC: NEGATIVE NG/ML
FENTANYL UR-MCNC: NEGATIVE NG/ML
HYDROCODONE UR-MCNC: NEGATIVE NG/ML
HYDROMORPHONE UR-MCNC: NEGATIVE NG/ML
LORAZEPAM UR-MCNC: NEGATIVE NG/ML
METHADONE UR-MCNC: NEGATIVE NG/ML
MORPHINE UR-MCNC: NEGATIVE NG/ML
NORBUPRENORPHINE UR-MCNC: 359 NG/ML
NORDIAZEPAM UR-MCNC: NEGATIVE NG/ML
NORFENTANYL UR-MCNC: NEGATIVE NG/ML
NORHYDROCODONE UR CFM-MCNC: NEGATIVE NG/ML
NOROXYCODONE UR CFM-MCNC: NEGATIVE NG/ML
NORTRAMADOL UR-MCNC: NEGATIVE NG/ML
OH-ETHYLFLURAZ UR-MCNC: NEGATIVE NG/ML
OXAZEPAM UR-MCNC: NEGATIVE NG/ML
OXIDANTS UR QL: NEGATIVE MCG/ML
OXYCODONE UR CFM-MCNC: NEGATIVE NG/ML
OXYMORPHONE UR CFM-MCNC: NEGATIVE NG/ML
PCP UR QL: NEGATIVE NG/ML
PH UR: 5 [PH] (ref 4.5–9)
QUEST 6 ACETYLMORPHINE: NEGATIVE NG/ML
QUEST NALOXONE, URINE: 424 NG/ML
QUEST NOTES AND COMMENTS: ABNORMAL
QUEST ZOLPIDEM: NEGATIVE NG/ML
TEMAZEPAM UR-MCNC: NEGATIVE NG/ML
THC UR QL: POSITIVE NG/ML
THC UR-MCNC: 2268 NG/ML
TRAMADOL UR-MCNC: NEGATIVE NG/ML
ZOLPIDEM PHENYL-4-CARB UR CFM-MCNC: NEGATIVE NG/ML

## 2025-06-23 ENCOUNTER — TELEPHONE (OUTPATIENT)
Facility: HOSPITAL | Age: 35
End: 2025-06-23
Payer: MEDICAID

## 2025-06-23 DIAGNOSIS — Z76.0 MEDICATION REFILL: ICD-10-CM

## 2025-06-23 NOTE — TELEPHONE ENCOUNTER
Pt is requesting refills on his pain medication, the pt had an appointment scheduled for 6/18 but no showed the appt.

## 2025-06-24 PROCEDURE — RXMED WILLOW AMBULATORY MEDICATION CHARGE

## 2025-06-24 RX ORDER — BUPRENORPHINE AND NALOXONE 12; 3 MG/1; MG/1
1 FILM, SOLUBLE BUCCAL; SUBLINGUAL DAILY
Qty: 30 FILM | Refills: 1 | Status: SHIPPED | OUTPATIENT
Start: 2025-06-24 | End: 2025-08-23

## 2025-06-27 ENCOUNTER — PHARMACY VISIT (OUTPATIENT)
Dept: PHARMACY | Facility: CLINIC | Age: 35
End: 2025-06-27
Payer: MEDICARE

## 2025-08-24 PROCEDURE — RXMED WILLOW AMBULATORY MEDICATION CHARGE

## 2025-09-02 ENCOUNTER — PHARMACY VISIT (OUTPATIENT)
Dept: PHARMACY | Facility: CLINIC | Age: 35
End: 2025-09-02
Payer: MEDICARE